# Patient Record
Sex: MALE | Race: WHITE | NOT HISPANIC OR LATINO
[De-identification: names, ages, dates, MRNs, and addresses within clinical notes are randomized per-mention and may not be internally consistent; named-entity substitution may affect disease eponyms.]

---

## 2017-11-02 ENCOUNTER — APPOINTMENT (OUTPATIENT)
Dept: NEUROLOGY | Facility: CLINIC | Age: 72
End: 2017-11-02

## 2018-05-14 ENCOUNTER — APPOINTMENT (OUTPATIENT)
Dept: NEUROLOGY | Facility: CLINIC | Age: 73
End: 2018-05-14
Payer: MEDICARE

## 2018-05-14 VITALS
BODY MASS INDEX: 25.23 KG/M2 | DIASTOLIC BLOOD PRESSURE: 77 MMHG | HEIGHT: 66 IN | WEIGHT: 157 LBS | SYSTOLIC BLOOD PRESSURE: 129 MMHG | HEART RATE: 59 BPM

## 2018-05-14 DIAGNOSIS — Z86.79 PERSONAL HISTORY OF OTHER DISEASES OF THE CIRCULATORY SYSTEM: ICD-10-CM

## 2018-05-14 DIAGNOSIS — R68.89 OTHER GENERAL SYMPTOMS AND SIGNS: ICD-10-CM

## 2018-05-14 DIAGNOSIS — E78.5 HYPERLIPIDEMIA, UNSPECIFIED: ICD-10-CM

## 2018-05-14 DIAGNOSIS — Z86.2 PERSONAL HISTORY OF DISEASES OF THE BLOOD AND BLOOD-FORMING ORGANS AND CERTAIN DISORDERS INVOLVING THE IMMUNE MECHANISM: ICD-10-CM

## 2018-05-14 DIAGNOSIS — Z82.49 FAMILY HISTORY OF ISCHEMIC HEART DISEASE AND OTHER DISEASES OF THE CIRCULATORY SYSTEM: ICD-10-CM

## 2018-05-14 DIAGNOSIS — Z78.9 OTHER SPECIFIED HEALTH STATUS: ICD-10-CM

## 2018-05-14 DIAGNOSIS — Z80.0 FAMILY HISTORY OF MALIGNANT NEOPLASM OF DIGESTIVE ORGANS: ICD-10-CM

## 2018-05-14 PROCEDURE — 99205 OFFICE O/P NEW HI 60 MIN: CPT

## 2018-05-18 ENCOUNTER — OTHER (OUTPATIENT)
Age: 73
End: 2018-05-18

## 2018-05-30 ENCOUNTER — FORM ENCOUNTER (OUTPATIENT)
Age: 73
End: 2018-05-30

## 2018-05-31 ENCOUNTER — APPOINTMENT (OUTPATIENT)
Dept: MRI IMAGING | Facility: CLINIC | Age: 73
End: 2018-05-31
Payer: MEDICARE

## 2018-05-31 ENCOUNTER — OUTPATIENT (OUTPATIENT)
Dept: OUTPATIENT SERVICES | Facility: HOSPITAL | Age: 73
LOS: 1 days | End: 2018-05-31
Payer: COMMERCIAL

## 2018-05-31 DIAGNOSIS — G45.4 TRANSIENT GLOBAL AMNESIA: ICD-10-CM

## 2018-05-31 PROCEDURE — 70551 MRI BRAIN STEM W/O DYE: CPT

## 2018-05-31 PROCEDURE — 70551 MRI BRAIN STEM W/O DYE: CPT | Mod: 26

## 2018-07-13 ENCOUNTER — APPOINTMENT (OUTPATIENT)
Dept: NEUROLOGY | Facility: CLINIC | Age: 73
End: 2018-07-13
Payer: MEDICARE

## 2018-07-13 ENCOUNTER — RESULT CHARGE (OUTPATIENT)
Age: 73
End: 2018-07-13

## 2018-07-13 PROCEDURE — 93886 INTRACRANIAL COMPLETE STUDY: CPT

## 2018-07-13 PROCEDURE — 93892 TCD EMBOLI DETECT W/O INJ: CPT

## 2018-07-13 PROCEDURE — 95819 EEG AWAKE AND ASLEEP: CPT

## 2018-07-13 PROCEDURE — 93880 EXTRACRANIAL BILAT STUDY: CPT

## 2018-08-01 LAB
FOLATE SERPL-MCNC: >20 NG/ML
T PALLIDUM AB SER QL IA: NEGATIVE
T3FREE SERPL-MCNC: 2.5 PG/ML
T4 FREE SERPL-MCNC: 0.8 NG/DL
TSH SERPL-ACNC: 1.87 UIU/ML
VIT B12 SERPL-MCNC: 492 PG/ML

## 2018-08-03 LAB — VIT B1 SERPL-MCNC: 149.7 NMOL/L

## 2018-08-13 ENCOUNTER — APPOINTMENT (OUTPATIENT)
Dept: NEUROLOGY | Facility: CLINIC | Age: 73
End: 2018-08-13
Payer: MEDICARE

## 2018-08-13 VITALS
DIASTOLIC BLOOD PRESSURE: 59 MMHG | WEIGHT: 155 LBS | SYSTOLIC BLOOD PRESSURE: 108 MMHG | HEART RATE: 78 BPM | BODY MASS INDEX: 24.91 KG/M2 | HEIGHT: 66 IN

## 2018-08-13 DIAGNOSIS — R41.3 OTHER AMNESIA: ICD-10-CM

## 2018-08-13 DIAGNOSIS — G45.4 TRANSIENT GLOBAL AMNESIA: ICD-10-CM

## 2018-08-13 PROCEDURE — 99214 OFFICE O/P EST MOD 30 MIN: CPT

## 2022-06-23 ENCOUNTER — OUTPATIENT (OUTPATIENT)
Dept: OUTPATIENT SERVICES | Facility: HOSPITAL | Age: 77
LOS: 1 days | End: 2022-06-23

## 2022-06-23 DIAGNOSIS — R00.2 PALPITATIONS: ICD-10-CM

## 2022-06-23 DIAGNOSIS — R06.00 DYSPNEA, UNSPECIFIED: ICD-10-CM

## 2022-06-24 ENCOUNTER — OUTPATIENT (OUTPATIENT)
Dept: OUTPATIENT SERVICES | Facility: HOSPITAL | Age: 77
LOS: 1 days | End: 2022-06-24
Payer: COMMERCIAL

## 2022-06-24 DIAGNOSIS — R00.2 PALPITATIONS: ICD-10-CM

## 2022-06-24 DIAGNOSIS — R06.00 DYSPNEA, UNSPECIFIED: ICD-10-CM

## 2022-06-24 PROCEDURE — 93017 CV STRESS TEST TRACING ONLY: CPT

## 2022-06-24 PROCEDURE — 93016 CV STRESS TEST SUPVJ ONLY: CPT

## 2022-06-24 PROCEDURE — 78452 HT MUSCLE IMAGE SPECT MULT: CPT | Mod: 26,MH

## 2022-06-24 PROCEDURE — A9502: CPT

## 2022-06-24 PROCEDURE — 78452 HT MUSCLE IMAGE SPECT MULT: CPT | Mod: MH

## 2022-06-24 PROCEDURE — 93018 CV STRESS TEST I&R ONLY: CPT

## 2022-09-26 PROBLEM — I35.9 AORTIC VALVE DISEASE: Status: ACTIVE | Noted: 2022-09-26

## 2022-09-26 PROBLEM — R06.00 DYSPNEA ON EFFORT: Status: ACTIVE | Noted: 2022-09-26

## 2022-09-27 ENCOUNTER — APPOINTMENT (OUTPATIENT)
Dept: CARDIOTHORACIC SURGERY | Facility: CLINIC | Age: 77
End: 2022-09-27

## 2022-09-27 ENCOUNTER — LABORATORY RESULT (OUTPATIENT)
Age: 77
End: 2022-09-27

## 2022-09-27 VITALS
SYSTOLIC BLOOD PRESSURE: 188 MMHG | OXYGEN SATURATION: 98 % | TEMPERATURE: 98 F | HEART RATE: 82 BPM | DIASTOLIC BLOOD PRESSURE: 80 MMHG | RESPIRATION RATE: 14 BRPM

## 2022-09-27 VITALS — HEIGHT: 65 IN | WEIGHT: 157 LBS | BODY MASS INDEX: 26.16 KG/M2

## 2022-09-27 VITALS — DIASTOLIC BLOOD PRESSURE: 94 MMHG | SYSTOLIC BLOOD PRESSURE: 193 MMHG

## 2022-09-27 DIAGNOSIS — R06.00 DYSPNEA, UNSPECIFIED: ICD-10-CM

## 2022-09-27 DIAGNOSIS — M54.30 SCIATICA, UNSPECIFIED SIDE: ICD-10-CM

## 2022-09-27 DIAGNOSIS — I10 ESSENTIAL (PRIMARY) HYPERTENSION: ICD-10-CM

## 2022-09-27 DIAGNOSIS — M48.00 SPINAL STENOSIS, SITE UNSPECIFIED: ICD-10-CM

## 2022-09-27 DIAGNOSIS — Z87.11 PERSONAL HISTORY OF PEPTIC ULCER DISEASE: ICD-10-CM

## 2022-09-27 DIAGNOSIS — I35.9 NONRHEUMATIC AORTIC VALVE DISORDER, UNSPECIFIED: ICD-10-CM

## 2022-09-27 LAB
ALBUMIN SERPL ELPH-MCNC: 4.9 G/DL
ALP BLD-CCNC: 94 U/L
ALT SERPL-CCNC: 14 U/L
ANION GAP SERPL CALC-SCNC: 15 MMOL/L
AST SERPL-CCNC: 21 U/L
BILIRUB SERPL-MCNC: 1.2 MG/DL
BUN SERPL-MCNC: 28 MG/DL
CALCIUM SERPL-MCNC: 10.9 MG/DL
CHLORIDE SERPL-SCNC: 105 MMOL/L
CO2 SERPL-SCNC: 23 MMOL/L
CREAT SERPL-MCNC: 1.02 MG/DL
EGFR: 75 ML/MIN/1.73M2
GLUCOSE SERPL-MCNC: 88 MG/DL
NT-PROBNP SERPL-MCNC: 268 PG/ML
POTASSIUM SERPL-SCNC: 4.7 MMOL/L
PROT SERPL-MCNC: 8.1 G/DL
SODIUM SERPL-SCNC: 144 MMOL/L

## 2022-09-27 PROCEDURE — 99215 OFFICE O/P EST HI 40 MIN: CPT

## 2022-09-27 RX ORDER — METOPROLOL SUCCINATE 25 MG/1
25 TABLET, EXTENDED RELEASE ORAL
Qty: 30 | Refills: 1 | Status: COMPLETED | COMMUNITY
Start: 2022-09-27 | End: 2022-09-27

## 2022-09-27 RX ORDER — ASPIRIN 81 MG/1
81 TABLET, CHEWABLE ORAL
Qty: 30 | Refills: 3 | Status: COMPLETED | COMMUNITY
Start: 2018-05-14 | End: 2022-09-27

## 2022-09-27 RX ORDER — FOLIC ACID 1 MG/1
1 TABLET ORAL
Qty: 30 | Refills: 0 | Status: ACTIVE | COMMUNITY
Start: 2022-09-27

## 2022-09-27 NOTE — REVIEW OF SYSTEMS
[Feeling Tired] : feeling tired [Loss Of Hearing] : hearing loss [Shortness Of Breath] : shortness of breath [Joint Stiffness] : joint stiffness [Eye Pain] : no eye pain [Earache] : no earache [Chest Pain] : no chest pain [Palpitations] : no palpitations [Abdominal Pain] : no abdominal pain [Vomiting] : no vomiting [Dysuria] : no dysuria [Skin Lesions] : no skin lesions [Dizziness] : no dizziness

## 2022-09-27 NOTE — END OF VISIT
[Time Spent: ___ minutes] : I have spent [unfilled] minutes of time on the encounter. [FreeTextEntry3] : I personally performed the services described in the documentation, reviewed the documentation recorded by the scribe in my presence and it accurately and completely records my words and actions.\par \par I, Mima Shaffer NP, am scribing for Dr. Godfrey Gonzalez, the following sections HISTORY OF PRESENT ILLNESS, PAST MEDICAL/FAMILY/SOCIAL HISTORY; REVIEW OF SYSTEMS; VITAL SIGNS; PHYSICAL EXAM; DISPOSITION.\par

## 2022-09-27 NOTE — DATA REVIEWED
[FreeTextEntry1] : TTE on 8/30/2022 demonstrated\par LV cavity size is normal. \par LV function is 68%\par aortic valve is not well visualized\par Aortic valve appears heavily calcified, AoV 4.2 m/sec, pGr 54 mmHg, PHILL 0.95 cm²\par Consistent with severe AS\par \par Stress testing 6/2022\par Review of raw data shows: increased gut activity around\par lateral ventricular heart border on both rest and stress. This decreases sensitivity.  The left ventricle was normal in size. Normal myocardial perfusion scan, with no evidence of infarction or\par inducible ischemia, though with decreased sensitivity due to adjacent loop of bowel as per above.\par Transient ischemic dilation (TID) ratio was normal at: 0.77\par GATED ANALYSIS:Post-stress resting myocardial perfusion gated SPECT imaging was performed (LVEF = 69 %;LVEDV = 79 ml.) IMPRESSIONS: The left ventricle was normal in size. Normal myocardial\par perfusion scan, with no evidence of infarction or inducible ischemia, though with decreased sensitivity due to adjacent loop of bowel as per above.  * Post-stress resting myocardial perfusion gated SPECT imaging was performed (LVEF = 69 %;LVEDV = 79 ml.)

## 2022-09-27 NOTE — HISTORY OF PRESENT ILLNESS
[FreeTextEntry1] : Darnell is a 77 year old male retired  who is referred by Dr. Wu for evaluation for NAJMA. \par \par He reports dyspnea on exertion when climbing 1 flight of stairs (NYHA II). The dyspnea resolves at rest. Patient reports two prior episodes of "disorientation." Follow up MRI was unremarkable and he was told he might have had a TIAs. \par \par His son Ronald endorses that patient has episodes of memory loss within the last five year. Patient lives with his wife (caretaker) and his independent in his ADLs. \par \par Abdominal Aortic Aneurysm-\par US from 2018 showed fusiform aneurysm dilatation measuring 6.2 cm x 3.8 x 3/5 cm \par \par TTE on 8/30/2022 demonstrated\par LV cavity size is normal. \par LV function is 68%\par aortic valve is not well visualized\par Aortic valve appears heavily calcified, AoV 4.2 m/sec, pGr 54 mmHg, PHILL 0.95 cm²\par Consistent with severe AS\par \par

## 2022-09-27 NOTE — PHYSICAL EXAM
[General Appearance - Alert] : alert [Sclera] : the sclera and conjunctiva were normal [Outer Ear] : the ears and nose were normal in appearance [Jugular Venous Distention Increased] : there was no jugular-venous distention [] : no respiratory distress [Respiration, Rhythm And Depth] : normal respiratory rhythm and effort [II] : a grade 2 [Examination Of The Chest] : the chest was normal in appearance [Breast Appearance] : normal in appearance [Bowel Sounds] : normal bowel sounds [Cervical Lymph Nodes Enlarged Posterior Bilaterally] : posterior cervical [Cervical Lymph Nodes Enlarged Anterior Bilaterally] : anterior cervical [No CVA Tenderness] : no ~M costovertebral angle tenderness [Involuntary Movements] : no involuntary movements were seen [Skin Color & Pigmentation] : normal skin color and pigmentation [Oriented To Time, Place, And Person] : oriented to person, place, and time [Right Carotid Bruit] : no bruit heard over the right carotid [Left Carotid Bruit] : no bruit heard over the left carotid [FreeTextEntry1] : deferred

## 2022-09-27 NOTE — ASSESSMENT
[FreeTextEntry1] : Darnell is a 77 year old male with PMH of gastric ulcer, Rheumatic fever (age 10), HLD, HTN, anemia, and aortic valve stenosis. He was referred by Dr. Wu for evaluation for NAJMA. He reports dyspnea on exertion when climbing 1 flight of stairs (NYHA II). The dyspnea resolves at rest. Patient reports two prior episodes of "disorientation." Follow up MRI as per patient he was told were possible TIAs. \par \par His son Ronald endorses that patient has episodes of memory loss within the last five year. He lives with his wife. \par \par Abdominal Aortic Aneurysm-\par US from 2018 showed fusiform aneurysm dilatation measuring 6.2 cm x 3.8 x 3/5 cm \par \par TTE on 8/30/2022 demonstrated\par LV cavity size is normal. \par LV function is 68%\par aortic valve is not well visualized\par Aortic valve appears heavily calcified, AoV 4.2 m/sec, pGr 54 mmHg, PHILL 0.95 cm²\par Consistent with severe AS\par \par Stress testing 6/2022\par Review of raw data shows: increased gut activity around\par lateral ventricular heart border on both rest and stress. This decreases sensitivity.  The left ventricle was normal in size. Normal myocardial perfusion scan, with no evidence of infarction or\par inducible ischemia, though with decreased sensitivity due to adjacent loop of bowel as per above.\par Transient ischemic dilation (TID) ratio was normal at: 0.77\par GATED ANALYSIS:Post-stress resting myocardial perfusion gated SPECT imaging was performed (LVEF = 69 %;LVEDV = 79 ml.) IMPRESSIONS: The left ventricle was normal in size. Normal myocardial\par perfusion scan, with no evidence of infarction or inducible ischemia, though with decreased sensitivity due to adjacent loop of bowel as per above.  * Post-stress resting myocardial perfusion gated SPECT imaging was performed (LVEF = 69 %;LVEDV = 79 ml.) \par \par I have reviewed the patient's medical records, diagnostic images during the time of this office consultation and have made the following recommendation. Review of the imaging shows severe AS and will require surgical intervention. \par \par Risks, benefits and alternatives to surgery discussed. Risks included but not limited to bleeding, risk of stroke, myocardial Infarction, kidney problems, blood transfusion, permanent pacemaker implantation, infections and death. Operative mortality and complication risks are low. Various approaches discussed in detail. The patient will benefit and is a candidate for a TAVR. \par \par All questions have been fully answered and concerns addressed. Patient would like to proceed with surgery.\par \par Plan:\par 1) Hypertension- Start Losartan 25mg daily and follow up in 1 week with PCP for blood pressure check. \par      --Patient seems reluctant to start Losartan due to his father's history with an adverse reaction to "blood pressure medication."\par 2) Structural CT\par 3) Cardiac angiogram \par

## 2022-09-28 LAB
BASOPHILS # BLD AUTO: 0.06 K/UL
BASOPHILS NFR BLD AUTO: 0.5 %
EOSINOPHIL # BLD AUTO: 0.37 K/UL
EOSINOPHIL NFR BLD AUTO: 2.9 %
HCT VFR BLD CALC: 45.7 %
HGB BLD-MCNC: 14.3 G/DL
IMM GRANULOCYTES NFR BLD AUTO: 0.4 %
LYMPHOCYTES # BLD AUTO: 3.7 K/UL
LYMPHOCYTES NFR BLD AUTO: 29 %
MAN DIFF?: NORMAL
MCHC RBC-ENTMCNC: 31.3 GM/DL
MCHC RBC-ENTMCNC: 32.9 PG
MCV RBC AUTO: 105.3 FL
MONOCYTES # BLD AUTO: 1.41 K/UL
MONOCYTES NFR BLD AUTO: 11.1 %
NEUTROPHILS # BLD AUTO: 7.17 K/UL
NEUTROPHILS NFR BLD AUTO: 56.1 %
PLATELET # BLD AUTO: 139 K/UL
RBC # BLD: 4.34 M/UL
RBC # FLD: 12.8 %
WBC # FLD AUTO: 12.76 K/UL

## 2022-10-05 ENCOUNTER — APPOINTMENT (OUTPATIENT)
Dept: CARDIOLOGY | Facility: CLINIC | Age: 77
End: 2022-10-05

## 2022-10-17 ENCOUNTER — APPOINTMENT (OUTPATIENT)
Dept: ULTRASOUND IMAGING | Facility: HOSPITAL | Age: 77
End: 2022-10-17

## 2023-01-19 RX ORDER — CHLORHEXIDINE GLUCONATE 4 %
LIQUID (ML) TOPICAL DAILY
Refills: 0 | Status: DISCONTINUED | COMMUNITY
Start: 2018-05-14 | End: 2023-01-19

## 2023-01-19 RX ORDER — ATORVASTATIN CALCIUM 40 MG/1
40 TABLET, FILM COATED ORAL DAILY
Refills: 0 | Status: DISCONTINUED | COMMUNITY
Start: 2018-05-14 | End: 2023-01-19

## 2023-01-19 RX ORDER — CALCIUM CITRATE/VITAMIN D3 315MG-6.25
TABLET ORAL
Refills: 0 | Status: ACTIVE | COMMUNITY

## 2023-01-19 RX ORDER — AMLODIPINE BESYLATE 2.5 MG/1
2.5 TABLET ORAL DAILY
Refills: 0 | Status: ACTIVE | COMMUNITY

## 2023-01-19 RX ORDER — PRAVASTATIN SODIUM 40 MG/1
40 TABLET ORAL DAILY
Refills: 0 | Status: ACTIVE | COMMUNITY

## 2023-01-19 RX ORDER — LOSARTAN POTASSIUM 25 MG/1
25 TABLET, FILM COATED ORAL
Qty: 30 | Refills: 0 | Status: DISCONTINUED | COMMUNITY
Start: 2022-09-27 | End: 2023-01-19

## 2023-01-24 ENCOUNTER — NON-APPOINTMENT (OUTPATIENT)
Age: 78
End: 2023-01-24

## 2023-01-24 ENCOUNTER — APPOINTMENT (OUTPATIENT)
Dept: CT IMAGING | Facility: HOSPITAL | Age: 78
End: 2023-01-24

## 2023-01-24 ENCOUNTER — APPOINTMENT (OUTPATIENT)
Dept: CARDIOLOGY | Facility: CLINIC | Age: 78
End: 2023-01-24
Payer: MEDICARE

## 2023-01-24 ENCOUNTER — OUTPATIENT (OUTPATIENT)
Dept: OUTPATIENT SERVICES | Facility: HOSPITAL | Age: 78
LOS: 1 days | End: 2023-01-24
Payer: MEDICARE

## 2023-01-24 ENCOUNTER — APPOINTMENT (OUTPATIENT)
Dept: CARDIOTHORACIC SURGERY | Facility: CLINIC | Age: 78
End: 2023-01-24

## 2023-01-24 VITALS
WEIGHT: 155 LBS | TEMPERATURE: 98 F | DIASTOLIC BLOOD PRESSURE: 82 MMHG | OXYGEN SATURATION: 97 % | HEART RATE: 98 BPM | SYSTOLIC BLOOD PRESSURE: 150 MMHG | HEIGHT: 65 IN | BODY MASS INDEX: 25.83 KG/M2

## 2023-01-24 DIAGNOSIS — I35.9 NONRHEUMATIC AORTIC VALVE DISORDER, UNSPECIFIED: ICD-10-CM

## 2023-01-24 PROCEDURE — 74174 CTA ABD&PLVS W/CONTRAST: CPT | Mod: 26

## 2023-01-24 PROCEDURE — 75574 CT ANGIO HRT W/3D IMAGE: CPT | Mod: 26

## 2023-01-24 PROCEDURE — 99204 OFFICE O/P NEW MOD 45 MIN: CPT | Mod: 25

## 2023-01-24 PROCEDURE — 93000 ELECTROCARDIOGRAM COMPLETE: CPT

## 2023-01-24 PROCEDURE — 71275 CT ANGIOGRAPHY CHEST: CPT | Mod: 26

## 2023-01-26 ENCOUNTER — NON-APPOINTMENT (OUTPATIENT)
Age: 78
End: 2023-01-26

## 2023-01-27 NOTE — HISTORY OF PRESENT ILLNESS
[FreeTextEntry1] : 77-year-old male with known history of severe aortic stenosis.  He was evaluated in the TAVR clinic 3 months ago, at that point patient was reevaluating his decision about proceeding with valve intervention.  He is here on a visit with us today to discuss TAVR options, he underwent a CT scan done today results are not yet available.  Patient reports that he has had difficulty walking 1 flight of stairs which is limited by his back pain because of spinal stenosis and his fatigue.  He also states that he wants to get spinal surgery done but this has been deferred in view of his aortic stenosis and his symptoms arising from aortic stenosis that make him high risk for surgery.  Denies any orthopnea PND edema syncope.\par \par \par TTE on 8/30/2022 demonstrated\par LV cavity size is normal. \par LV function is 68%\par aortic valve is not well visualized\par Aortic valve appears heavily calcified, AoV 4.2 m/sec, pGr 54 mmHg, PHILL 0.95 cm²\par Consistent with severe AS\par

## 2023-01-27 NOTE — ASSESSMENT
[FreeTextEntry1] : Severe symptomatic aortic stenosis:\par Detailed discussion with the patient and family about Aortic stenosis , including the clinic presentation, symptoms and natural History. Also explained the interventional indications and options for SAVR vs TAVR. TAVR procedure was explained to them, including the potential risk not just limited to vascular injury, needs for vascular intervention, transfusion, MI, CVA, need for PPM, death. \par Patient and family verbalized the understanding of above information and would like to pursue further w.u with Cath.  And further planning for TAVR as per the findings from the cardiac cath procedure and the CT scan that was performed today.\par

## 2023-01-27 NOTE — PHYSICAL EXAM
[Frail] : frail [Normal Conjunctiva] : normal conjunctiva [Normal Venous Pressure] : normal venous pressure [Normal S1, S2] : normal S1, S2 [Clear Lung Fields] : clear lung fields [Soft] : abdomen soft [de-identified] : 3 out of 6 late peaking ejection systolic murmur at the aortic area

## 2023-01-27 NOTE — REVIEW OF SYSTEMS
[Feeling Tired] : feeling tired [Eye Pain] : no eye pain [Earache] : no earache [Loss Of Hearing] : hearing loss [Chest Pain] : no chest pain [Palpitations] : no palpitations [Shortness Of Breath] : shortness of breath [Abdominal Pain] : no abdominal pain [Vomiting] : no vomiting [Dysuria] : no dysuria [Joint Stiffness] : joint stiffness [Skin Lesions] : no skin lesions [Dizziness] : no dizziness

## 2023-01-30 ENCOUNTER — OUTPATIENT (OUTPATIENT)
Dept: OUTPATIENT SERVICES | Facility: HOSPITAL | Age: 78
LOS: 1 days | End: 2023-01-30
Payer: COMMERCIAL

## 2023-01-30 DIAGNOSIS — Z11.52 ENCOUNTER FOR SCREENING FOR COVID-19: ICD-10-CM

## 2023-01-30 LAB — SARS-COV-2 RNA SPEC QL NAA+PROBE: SIGNIFICANT CHANGE UP

## 2023-01-30 PROCEDURE — C9803: CPT

## 2023-01-30 PROCEDURE — U0003: CPT

## 2023-01-30 PROCEDURE — U0005: CPT

## 2023-02-01 ENCOUNTER — APPOINTMENT (OUTPATIENT)
Dept: ULTRASOUND IMAGING | Facility: HOSPITAL | Age: 78
End: 2023-02-01

## 2023-02-01 ENCOUNTER — OUTPATIENT (OUTPATIENT)
Dept: OUTPATIENT SERVICES | Facility: HOSPITAL | Age: 78
LOS: 1 days | End: 2023-02-01
Payer: COMMERCIAL

## 2023-02-01 VITALS
OXYGEN SATURATION: 98 % | DIASTOLIC BLOOD PRESSURE: 69 MMHG | RESPIRATION RATE: 18 BRPM | HEIGHT: 65 IN | HEART RATE: 64 BPM | TEMPERATURE: 97 F | WEIGHT: 160.06 LBS | SYSTOLIC BLOOD PRESSURE: 145 MMHG

## 2023-02-01 DIAGNOSIS — I35.0 NONRHEUMATIC AORTIC (VALVE) STENOSIS: ICD-10-CM

## 2023-02-01 DIAGNOSIS — Z98.890 OTHER SPECIFIED POSTPROCEDURAL STATES: Chronic | ICD-10-CM

## 2023-02-01 DIAGNOSIS — Z01.818 ENCOUNTER FOR OTHER PREPROCEDURAL EXAMINATION: ICD-10-CM

## 2023-02-01 DIAGNOSIS — Z90.81 ACQUIRED ABSENCE OF SPLEEN: Chronic | ICD-10-CM

## 2023-02-01 LAB
A1C WITH ESTIMATED AVERAGE GLUCOSE RESULT: 5.1 % — SIGNIFICANT CHANGE UP (ref 4–5.6)
ANION GAP SERPL CALC-SCNC: 9 MMOL/L — SIGNIFICANT CHANGE UP (ref 5–17)
BLD GP AB SCN SERPL QL: NEGATIVE — SIGNIFICANT CHANGE UP
BUN SERPL-MCNC: 21 MG/DL — SIGNIFICANT CHANGE UP (ref 7–23)
CALCIUM SERPL-MCNC: 10.3 MG/DL — SIGNIFICANT CHANGE UP (ref 8.4–10.5)
CHLORIDE SERPL-SCNC: 108 MMOL/L — SIGNIFICANT CHANGE UP (ref 96–108)
CO2 SERPL-SCNC: 27 MMOL/L — SIGNIFICANT CHANGE UP (ref 22–31)
CREAT SERPL-MCNC: 0.95 MG/DL — SIGNIFICANT CHANGE UP (ref 0.5–1.3)
EGFR: 82 ML/MIN/1.73M2 — SIGNIFICANT CHANGE UP
ESTIMATED AVERAGE GLUCOSE: 100 MG/DL — SIGNIFICANT CHANGE UP (ref 68–114)
GLUCOSE SERPL-MCNC: 106 MG/DL — HIGH (ref 70–99)
HCT VFR BLD CALC: 43.4 % — SIGNIFICANT CHANGE UP (ref 39–50)
HGB BLD-MCNC: 14.2 G/DL — SIGNIFICANT CHANGE UP (ref 13–17)
MCHC RBC-ENTMCNC: 32.7 GM/DL — SIGNIFICANT CHANGE UP (ref 32–36)
MCHC RBC-ENTMCNC: 33.2 PG — SIGNIFICANT CHANGE UP (ref 27–34)
MCV RBC AUTO: 101.4 FL — HIGH (ref 80–100)
NRBC # BLD: 0 /100 WBCS — SIGNIFICANT CHANGE UP (ref 0–0)
PLATELET # BLD AUTO: 148 K/UL — LOW (ref 150–400)
POTASSIUM SERPL-MCNC: 4.6 MMOL/L — SIGNIFICANT CHANGE UP (ref 3.5–5.3)
POTASSIUM SERPL-SCNC: 4.6 MMOL/L — SIGNIFICANT CHANGE UP (ref 3.5–5.3)
RBC # BLD: 4.28 M/UL — SIGNIFICANT CHANGE UP (ref 4.2–5.8)
RBC # FLD: 12.7 % — SIGNIFICANT CHANGE UP (ref 10.3–14.5)
RH IG SCN BLD-IMP: POSITIVE — SIGNIFICANT CHANGE UP
SODIUM SERPL-SCNC: 144 MMOL/L — SIGNIFICANT CHANGE UP (ref 135–145)
WBC # BLD: 10.39 K/UL — SIGNIFICANT CHANGE UP (ref 3.8–10.5)
WBC # FLD AUTO: 10.39 K/UL — SIGNIFICANT CHANGE UP (ref 3.8–10.5)

## 2023-02-01 PROCEDURE — 80048 BASIC METABOLIC PNL TOTAL CA: CPT

## 2023-02-01 PROCEDURE — G0463: CPT

## 2023-02-01 PROCEDURE — 86850 RBC ANTIBODY SCREEN: CPT

## 2023-02-01 PROCEDURE — 87641 MR-STAPH DNA AMP PROBE: CPT

## 2023-02-01 PROCEDURE — 86901 BLOOD TYPING SEROLOGIC RH(D): CPT

## 2023-02-01 PROCEDURE — 83036 HEMOGLOBIN GLYCOSYLATED A1C: CPT

## 2023-02-01 PROCEDURE — 93880 EXTRACRANIAL BILAT STUDY: CPT | Mod: 26

## 2023-02-01 PROCEDURE — 93880 EXTRACRANIAL BILAT STUDY: CPT

## 2023-02-01 PROCEDURE — 87640 STAPH A DNA AMP PROBE: CPT

## 2023-02-01 PROCEDURE — 85027 COMPLETE CBC AUTOMATED: CPT

## 2023-02-01 PROCEDURE — 86900 BLOOD TYPING SEROLOGIC ABO: CPT

## 2023-02-01 RX ORDER — CEFUROXIME AXETIL 250 MG
1500 TABLET ORAL ONCE
Refills: 0 | Status: DISCONTINUED | OUTPATIENT
Start: 2023-02-09 | End: 2023-02-10

## 2023-02-01 NOTE — H&P PST ADULT - NSICDXPASTMEDICALHX_GEN_ALL_CORE_FT
PAST MEDICAL HISTORY:  Aortic stenosis     Congenital spherocytic anemia     HLD (hyperlipidemia)     Hypertension      PAST MEDICAL HISTORY:  Aortic stenosis     Bladder calculus     BPH without urinary obstruction     Congenital spherocytic anemia     HLD (hyperlipidemia)     Hypertension

## 2023-02-01 NOTE — H&P PST ADULT - HISTORY OF PRESENT ILLNESS
This is a 78 y/o male PMH severe aortic stenosis, spherocytic anemia at age 5, S/P splenectomy,  This is a 76 y/o male PMH lumbar stenosis, bladder stone, BPH,  hypertension, HLD, spherocytic anemia at age 5, S/P splenectomy, severe aortic stenosis, denies worsening dyspnea,  angina or syncope.  Presents today for TAVR 2/09/23, angiogram scheduled for tomorrow.    No H/O COVID infection, COVID swab scheduled 2/06/23 This is a 76 y/o male PMH lumbar stenosis, bladder stone, BPH, hypertension, HLD, spherocytic anemia at age 5, S/P splenectomy, severe aortic stenosis, denies worsening dyspnea, angina or syncope.  Presents today for TAVR 2/09/23, angiogram scheduled for tomorrow.    No H/O COVID infection, COVID swab scheduled 2/06/23

## 2023-02-02 ENCOUNTER — OUTPATIENT (OUTPATIENT)
Dept: OUTPATIENT SERVICES | Facility: HOSPITAL | Age: 78
LOS: 1 days | End: 2023-02-02
Payer: COMMERCIAL

## 2023-02-02 ENCOUNTER — TRANSCRIPTION ENCOUNTER (OUTPATIENT)
Age: 78
End: 2023-02-02

## 2023-02-02 VITALS
TEMPERATURE: 99 F | HEIGHT: 65 IN | OXYGEN SATURATION: 97 % | DIASTOLIC BLOOD PRESSURE: 93 MMHG | SYSTOLIC BLOOD PRESSURE: 188 MMHG | RESPIRATION RATE: 16 BRPM | HEART RATE: 91 BPM | WEIGHT: 162.04 LBS

## 2023-02-02 VITALS
DIASTOLIC BLOOD PRESSURE: 69 MMHG | OXYGEN SATURATION: 96 % | HEART RATE: 82 BPM | SYSTOLIC BLOOD PRESSURE: 135 MMHG | RESPIRATION RATE: 16 BRPM

## 2023-02-02 DIAGNOSIS — I35.9 NONRHEUMATIC AORTIC VALVE DISORDER, UNSPECIFIED: ICD-10-CM

## 2023-02-02 DIAGNOSIS — Z90.81 ACQUIRED ABSENCE OF SPLEEN: Chronic | ICD-10-CM

## 2023-02-02 DIAGNOSIS — Z98.890 OTHER SPECIFIED POSTPROCEDURAL STATES: Chronic | ICD-10-CM

## 2023-02-02 LAB
ANION GAP SERPL CALC-SCNC: 9 MMOL/L — SIGNIFICANT CHANGE UP (ref 5–17)
BUN SERPL-MCNC: 22 MG/DL — SIGNIFICANT CHANGE UP (ref 7–23)
CALCIUM SERPL-MCNC: 10.3 MG/DL — SIGNIFICANT CHANGE UP (ref 8.4–10.5)
CHLORIDE SERPL-SCNC: 104 MMOL/L — SIGNIFICANT CHANGE UP (ref 96–108)
CO2 SERPL-SCNC: 29 MMOL/L — SIGNIFICANT CHANGE UP (ref 22–31)
CREAT SERPL-MCNC: 1.03 MG/DL — SIGNIFICANT CHANGE UP (ref 0.5–1.3)
EGFR: 74 ML/MIN/1.73M2 — SIGNIFICANT CHANGE UP
GLUCOSE SERPL-MCNC: 100 MG/DL — HIGH (ref 70–99)
HCT VFR BLD CALC: 46.6 % — SIGNIFICANT CHANGE UP (ref 39–50)
HGB BLD-MCNC: 15.5 G/DL — SIGNIFICANT CHANGE UP (ref 13–17)
MCHC RBC-ENTMCNC: 33.2 PG — SIGNIFICANT CHANGE UP (ref 27–34)
MCHC RBC-ENTMCNC: 33.3 GM/DL — SIGNIFICANT CHANGE UP (ref 32–36)
MCV RBC AUTO: 99.8 FL — SIGNIFICANT CHANGE UP (ref 80–100)
MRSA PCR RESULT.: SIGNIFICANT CHANGE UP
NRBC # BLD: 0 /100 WBCS — SIGNIFICANT CHANGE UP (ref 0–0)
PLATELET # BLD AUTO: 163 K/UL — SIGNIFICANT CHANGE UP (ref 150–400)
POTASSIUM SERPL-MCNC: 4.7 MMOL/L — SIGNIFICANT CHANGE UP (ref 3.5–5.3)
POTASSIUM SERPL-SCNC: 4.7 MMOL/L — SIGNIFICANT CHANGE UP (ref 3.5–5.3)
RBC # BLD: 4.67 M/UL — SIGNIFICANT CHANGE UP (ref 4.2–5.8)
RBC # FLD: 12.9 % — SIGNIFICANT CHANGE UP (ref 10.3–14.5)
S AUREUS DNA NOSE QL NAA+PROBE: DETECTED
SODIUM SERPL-SCNC: 142 MMOL/L — SIGNIFICANT CHANGE UP (ref 135–145)
WBC # BLD: 12.57 K/UL — HIGH (ref 3.8–10.5)
WBC # FLD AUTO: 12.57 K/UL — HIGH (ref 3.8–10.5)

## 2023-02-02 PROCEDURE — 85027 COMPLETE CBC AUTOMATED: CPT

## 2023-02-02 PROCEDURE — 93454 CORONARY ARTERY ANGIO S&I: CPT | Mod: 26

## 2023-02-02 PROCEDURE — 93454 CORONARY ARTERY ANGIO S&I: CPT

## 2023-02-02 PROCEDURE — 93005 ELECTROCARDIOGRAM TRACING: CPT

## 2023-02-02 PROCEDURE — 80048 BASIC METABOLIC PNL TOTAL CA: CPT

## 2023-02-02 PROCEDURE — C1894: CPT

## 2023-02-02 PROCEDURE — C1887: CPT

## 2023-02-02 PROCEDURE — C1769: CPT

## 2023-02-02 PROCEDURE — 93010 ELECTROCARDIOGRAM REPORT: CPT

## 2023-02-02 NOTE — H&P CARDIOLOGY - HISTORY OF PRESENT ILLNESS
77 year old male h/o aortic valve stenosis (PHILL 0.95 cm2), AAA (6.2cm x 3.8 cm x 3.5 cm), HLD, hemolytic anemia, rheumatic fever and forgetfulness who c/o HERRERA with one flight (NYHAII) that resolves with rest who presents  for left heart cath in the setting of NAJMA work up.  77 year old male h/o aortic valve stenosis (PHILL 0.95 cm2), AAA (6.2cm x 3.8 cm x 3.5 cm), HTN,  HLD, hemolytic anemia, rheumatic fever and forgetfulness who c/o HERRERA with one flight (NYHAII) that resolves with rest who presents  for left heart cath in the setting of NAJMA work up.

## 2023-02-02 NOTE — ASU PATIENT PROFILE, ADULT - FALL HARM RISK - UNIVERSAL INTERVENTIONS
Bed in lowest position, wheels locked, appropriate side rails in place/Call bell, personal items and telephone in reach/Instruct patient to call for assistance before getting out of bed or chair/Non-slip footwear when patient is out of bed/[place] to call system/Physically safe environment - no spills, clutter or unnecessary equipment/Purposeful Proactive Rounding/Room/bathroom lighting operational, light cord in reach

## 2023-02-02 NOTE — ASU DISCHARGE PLAN (ADULT/PEDIATRIC) - CARE PROVIDER_API CALL
Naheed Aquino)  Cardiology; Internal Medicine  [phone] [place] [address], [address]  [place], [place] [id number]  Phone: [phone]  Fax: [phone]  Follow Up Time:

## 2023-02-02 NOTE — ASU DISCHARGE PLAN (ADULT/PEDIATRIC) - NS MD DC FALL RISK RISK
For information on Fall & Injury Prevention, visit: [email or web address] OR  [email or web address] OR  https://www.cdc.gov/steadi/patient.html

## 2023-02-02 NOTE — H&P CARDIOLOGY - NSICDXPASTMEDICALHX_GEN_ALL_CORE_FT
PAST MEDICAL HISTORY:  Aortic stenosis     Bladder calculus     BPH without urinary obstruction     Congenital spherocytic anemia     Gastric ulcer     H/O hemolytic anemia     H/O: rheumatic fever     History of abdominal aortic aneurysm (AAA)     HLD (hyperlipidemia)      PAST MEDICAL HISTORY:  Aortic stenosis     Bladder calculus     BPH without urinary obstruction     Congenital spherocytic anemia     Gastric ulcer     H/O hemolytic anemia     H/O: rheumatic fever     History of abdominal aortic aneurysm (AAA)     HLD (hyperlipidemia)     HTN (hypertension)

## 2023-02-02 NOTE — H&P CARDIOLOGY - NSICDXFAMILYHX_GEN_ALL_CORE_FT
FAMILY HISTORY:  Family history of colon cancer in mother    Father  Still living? No  FH: myocardial infarction, Age at diagnosis: Age Unknown

## 2023-02-03 DIAGNOSIS — A49.01 METHICILLIN SUSCEPTIBLE STAPHYLOCOCCUS AUREUS INFECTION, UNSPECIFIED SITE: ICD-10-CM

## 2023-02-06 ENCOUNTER — OUTPATIENT (OUTPATIENT)
Dept: OUTPATIENT SERVICES | Facility: HOSPITAL | Age: 78
LOS: 1 days | End: 2023-02-06
Payer: COMMERCIAL

## 2023-02-06 DIAGNOSIS — Z11.52 ENCOUNTER FOR SCREENING FOR COVID-19: ICD-10-CM

## 2023-02-06 DIAGNOSIS — Z98.890 OTHER SPECIFIED POSTPROCEDURAL STATES: Chronic | ICD-10-CM

## 2023-02-06 DIAGNOSIS — Z90.81 ACQUIRED ABSENCE OF SPLEEN: Chronic | ICD-10-CM

## 2023-02-06 PROBLEM — Z86.79 PERSONAL HISTORY OF OTHER DISEASES OF THE CIRCULATORY SYSTEM: Chronic | Status: ACTIVE | Noted: 2023-02-02

## 2023-02-06 PROBLEM — N21.0 CALCULUS IN BLADDER: Chronic | Status: ACTIVE | Noted: 2023-02-01

## 2023-02-06 PROBLEM — I35.0 NONRHEUMATIC AORTIC (VALVE) STENOSIS: Chronic | Status: ACTIVE | Noted: 2023-02-01

## 2023-02-06 PROBLEM — I10 ESSENTIAL (PRIMARY) HYPERTENSION: Chronic | Status: ACTIVE | Noted: 2023-02-02

## 2023-02-06 PROBLEM — K25.9 GASTRIC ULCER, UNSPECIFIED AS ACUTE OR CHRONIC, WITHOUT HEMORRHAGE OR PERFORATION: Chronic | Status: ACTIVE | Noted: 2023-02-02

## 2023-02-06 PROBLEM — D58.0 HEREDITARY SPHEROCYTOSIS: Chronic | Status: ACTIVE | Noted: 2023-02-01

## 2023-02-06 PROBLEM — Z86.2 PERSONAL HISTORY OF DISEASES OF THE BLOOD AND BLOOD-FORMING ORGANS AND CERTAIN DISORDERS INVOLVING THE IMMUNE MECHANISM: Chronic | Status: ACTIVE | Noted: 2023-02-02

## 2023-02-06 PROBLEM — N40.0 BENIGN PROSTATIC HYPERPLASIA WITHOUT LOWER URINARY TRACT SYMPTOMS: Chronic | Status: ACTIVE | Noted: 2023-02-01

## 2023-02-06 PROBLEM — E78.5 HYPERLIPIDEMIA, UNSPECIFIED: Chronic | Status: ACTIVE | Noted: 2023-02-01

## 2023-02-06 LAB — SARS-COV-2 RNA SPEC QL NAA+PROBE: SIGNIFICANT CHANGE UP

## 2023-02-06 PROCEDURE — U0003: CPT

## 2023-02-06 PROCEDURE — U0005: CPT

## 2023-02-06 PROCEDURE — C9803: CPT

## 2023-02-09 ENCOUNTER — INPATIENT (INPATIENT)
Facility: HOSPITAL | Age: 78
LOS: 0 days | Discharge: HOME HEALTH | DRG: 267 | End: 2023-02-10
Attending: THORACIC SURGERY (CARDIOTHORACIC VASCULAR SURGERY) | Admitting: THORACIC SURGERY (CARDIOTHORACIC VASCULAR SURGERY)
Payer: COMMERCIAL

## 2023-02-09 ENCOUNTER — APPOINTMENT (OUTPATIENT)
Dept: CARDIOTHORACIC SURGERY | Facility: HOSPITAL | Age: 78
End: 2023-02-09

## 2023-02-09 ENCOUNTER — TRANSCRIPTION ENCOUNTER (OUTPATIENT)
Age: 78
End: 2023-02-09

## 2023-02-09 VITALS
HEART RATE: 63 BPM | HEIGHT: 65 IN | DIASTOLIC BLOOD PRESSURE: 74 MMHG | RESPIRATION RATE: 18 BRPM | WEIGHT: 159.61 LBS | TEMPERATURE: 98 F | OXYGEN SATURATION: 99 % | SYSTOLIC BLOOD PRESSURE: 157 MMHG

## 2023-02-09 VITALS
OXYGEN SATURATION: 99 % | DIASTOLIC BLOOD PRESSURE: 74 MMHG | HEART RATE: 63 BPM | RESPIRATION RATE: 18 BRPM | WEIGHT: 162.04 LBS | HEIGHT: 65 IN | TEMPERATURE: 98 F | SYSTOLIC BLOOD PRESSURE: 157 MMHG

## 2023-02-09 DIAGNOSIS — I35.0 NONRHEUMATIC AORTIC (VALVE) STENOSIS: ICD-10-CM

## 2023-02-09 DIAGNOSIS — Z90.81 ACQUIRED ABSENCE OF SPLEEN: Chronic | ICD-10-CM

## 2023-02-09 DIAGNOSIS — Z98.890 OTHER SPECIFIED POSTPROCEDURAL STATES: Chronic | ICD-10-CM

## 2023-02-09 LAB
GLUCOSE BLDC GLUCOMTR-MCNC: 84 MG/DL — SIGNIFICANT CHANGE UP (ref 70–99)
RH IG SCN BLD-IMP: POSITIVE — SIGNIFICANT CHANGE UP

## 2023-02-09 PROCEDURE — 93306 TTE W/DOPPLER COMPLETE: CPT | Mod: 26

## 2023-02-09 PROCEDURE — 93010 ELECTROCARDIOGRAM REPORT: CPT

## 2023-02-09 PROCEDURE — 33361 REPLACE AORTIC VALVE PERQ: CPT | Mod: Q0,62

## 2023-02-09 PROCEDURE — 33361 REPLACE AORTIC VALVE PERQ: CPT | Mod: 62,Q0

## 2023-02-09 DEVICE — CATH VASCULAR EXPO EXPO AMPLATZ LEFT CURVE (AL1) 0.045" X 5FR X 100CM: Type: IMPLANTABLE DEVICE | Status: FUNCTIONAL

## 2023-02-09 DEVICE — GUIDEWIRE AMPLATZ EXTRA-STIFF CURVED .035" X 260CM: Type: IMPLANTABLE DEVICE | Status: FUNCTIONAL

## 2023-02-09 DEVICE — GWIRE STR .038X180 STIFF LONG TAPR: Type: IMPLANTABLE DEVICE | Status: FUNCTIONAL

## 2023-02-09 DEVICE — CATH VASCULAR EXPO VENTRICULAR PIGTAIL CURVE (PIG 145) 0.045" X 5FR X 10CM: Type: IMPLANTABLE DEVICE | Status: FUNCTIONAL

## 2023-02-09 DEVICE — CATH VASCULAR EXPO VENTRICULAR PIGTAIL CURVE (PIG) 0.045" X 5FR X 100CM: Type: IMPLANTABLE DEVICE | Status: FUNCTIONAL

## 2023-02-09 DEVICE — SHEATH INTRODUCER TERUMO PINNACLE CORONARY 6FR X 10CM X 0.038" MINI WIRE: Type: IMPLANTABLE DEVICE | Status: FUNCTIONAL

## 2023-02-09 DEVICE — PACER KT BLLN FLW DIR 5FR: Type: IMPLANTABLE DEVICE | Status: FUNCTIONAL

## 2023-02-09 DEVICE — SUT PERCLOSE PROGLIDE 6FR: Type: IMPLANTABLE DEVICE | Status: FUNCTIONAL

## 2023-02-09 DEVICE — CATH IMPULSE FR4 5F X 100CM: Type: IMPLANTABLE DEVICE | Status: FUNCTIONAL

## 2023-02-09 DEVICE — WIRE GUIDE EXCHANGE J TIP 3MM X 260CM: Type: IMPLANTABLE DEVICE | Status: FUNCTIONAL

## 2023-02-09 DEVICE — IMPLANTABLE DEVICE: Type: IMPLANTABLE DEVICE | Status: FUNCTIONAL

## 2023-02-09 DEVICE — SHEATH INTRO DRYSEAL FLEX 22FR 33CM: Type: IMPLANTABLE DEVICE | Status: FUNCTIONAL

## 2023-02-09 DEVICE — GWIRE GUID  0.035INX150CM: Type: IMPLANTABLE DEVICE | Status: FUNCTIONAL

## 2023-02-09 DEVICE — WIRE GD SAFARI2 275CM XSML CRV: Type: IMPLANTABLE DEVICE | Status: FUNCTIONAL

## 2023-02-09 DEVICE — CATH TAVR EVOLUT FX 18FR 34MM: Type: IMPLANTABLE DEVICE | Status: FUNCTIONAL

## 2023-02-09 DEVICE — VALVE TAVR EVOLUT FX 34MM: Type: IMPLANTABLE DEVICE | Status: FUNCTIONAL

## 2023-02-09 DEVICE — BLLN Z-MED II 23MMX4X100CM: Type: IMPLANTABLE DEVICE | Status: FUNCTIONAL

## 2023-02-09 DEVICE — LOADING SYSTEM EVOLUT FX 34MM: Type: IMPLANTABLE DEVICE | Status: FUNCTIONAL

## 2023-02-09 DEVICE — SHEATH INTRODUCER TERUMO PINNACLE CORONARY 8FR X 10CM X 0.038" MINI WIRE: Type: IMPLANTABLE DEVICE | Status: FUNCTIONAL

## 2023-02-09 RX ORDER — ASPIRIN/CALCIUM CARB/MAGNESIUM 324 MG
81 TABLET ORAL DAILY
Refills: 0 | Status: DISCONTINUED | OUTPATIENT
Start: 2023-02-09 | End: 2023-02-10

## 2023-02-09 RX ORDER — SODIUM CHLORIDE 9 MG/ML
3 INJECTION INTRAMUSCULAR; INTRAVENOUS; SUBCUTANEOUS EVERY 8 HOURS
Refills: 0 | Status: DISCONTINUED | OUTPATIENT
Start: 2023-02-09 | End: 2023-02-09

## 2023-02-09 RX ORDER — FOLIC ACID 0.8 MG
1 TABLET ORAL
Qty: 0 | Refills: 0 | DISCHARGE

## 2023-02-09 RX ORDER — FOLIC ACID 0.8 MG
1 TABLET ORAL DAILY
Refills: 0 | Status: DISCONTINUED | OUTPATIENT
Start: 2023-02-09 | End: 2023-02-10

## 2023-02-09 RX ORDER — AMLODIPINE BESYLATE 2.5 MG/1
1 TABLET ORAL
Qty: 0 | Refills: 0 | DISCHARGE

## 2023-02-09 RX ORDER — GABAPENTIN 400 MG/1
200 CAPSULE ORAL ONCE
Refills: 0 | Status: COMPLETED | OUTPATIENT
Start: 2023-02-09 | End: 2023-02-09

## 2023-02-09 RX ORDER — CHLORHEXIDINE GLUCONATE 213 G/1000ML
1 SOLUTION TOPICAL ONCE
Refills: 0 | Status: DISCONTINUED | OUTPATIENT
Start: 2023-02-09 | End: 2023-02-09

## 2023-02-09 RX ORDER — LIDOCAINE HCL 20 MG/ML
0.2 VIAL (ML) INJECTION ONCE
Refills: 0 | Status: DISCONTINUED | OUTPATIENT
Start: 2023-02-09 | End: 2023-02-09

## 2023-02-09 RX ORDER — ACETAMINOPHEN 500 MG
1000 TABLET ORAL ONCE
Refills: 0 | Status: COMPLETED | OUTPATIENT
Start: 2023-02-09 | End: 2023-02-09

## 2023-02-09 RX ORDER — CEFUROXIME AXETIL 250 MG
1500 TABLET ORAL EVERY 8 HOURS
Refills: 0 | Status: COMPLETED | OUTPATIENT
Start: 2023-02-09 | End: 2023-02-09

## 2023-02-09 RX ORDER — ACETAMINOPHEN 500 MG
1000 TABLET ORAL ONCE
Refills: 0 | Status: DISCONTINUED | OUTPATIENT
Start: 2023-02-09 | End: 2023-02-10

## 2023-02-09 RX ORDER — ATORVASTATIN CALCIUM 80 MG/1
40 TABLET, FILM COATED ORAL AT BEDTIME
Refills: 0 | Status: DISCONTINUED | OUTPATIENT
Start: 2023-02-09 | End: 2023-02-10

## 2023-02-09 RX ADMIN — Medication 1000 MILLIGRAM(S): at 09:36

## 2023-02-09 RX ADMIN — Medication 100 MILLIGRAM(S): at 16:48

## 2023-02-09 RX ADMIN — Medication 1 MILLIGRAM(S): at 16:48

## 2023-02-09 RX ADMIN — GABAPENTIN 200 MILLIGRAM(S): 400 CAPSULE ORAL at 09:49

## 2023-02-09 RX ADMIN — Medication 81 MILLIGRAM(S): at 16:48

## 2023-02-09 RX ADMIN — Medication 100 MILLIGRAM(S): at 23:38

## 2023-02-09 NOTE — PATIENT PROFILE ADULT - WAS YOUR LAST COVID-19 VACCINE GREATER THAN OR EQUAL TO TWO MONTHS AGO?
Consultation - ENT   Roxi Cruz 39 y o  female MRN: [id number]  Unit/Bed#: E2 -01 Encounter: [id number]      Assessment/Plan     Assessment:  1  Peritonsillar phlegmon right sided - no abscess cavity noted  2  Pharyngeal edema right sided  3  Finding of a thyroid nodule on CT neck    Plan:  Despite multiple attempts to locate an abscess cavity, none was found  The CT findings demonstrated phlegmon and not an actual abscess  Because of the appearance of the soft palate I felt that it would be imperative to rule this out completely  There is some edema surrounding the tonsil as well as swelling of the right pharyngeal wall  Based on inability to drain any significant mucopus I feel that she should continue with IV antibiotics and steroids for the next 24 hours before switching to oral medications  Unfortunately, she has a scheduled flight to the [facility] tomorrow morning and may not be able to stay in the hospital   I did let her know that it would be against medical advice to leave the hospital due to the fact that she has some swelling of the upper airway  She has a lot to consider since she has children at home and family members that are waiting for her in the [facility]  Upon discharge I would recommend continuing the oral clindamycin and oral prednisone taper  If possible and if she remains within this country I would like to see her next week for a follow-up  She is aware that if she has any worsening of symptoms well in the [facility] she should proceed immediately to an emergency room  I have also recommended that she have a non urgent ultrasound of the thyroid upon return to the [place]  This will characterize the nodule found in the thyroid to determine whether not she requires a biopsy      History of Present Illness   Physician Requesting Consult: Kurt Sanches MD  Reason for Consult / Principal Problem: right peritonsillar abscess and upper airway edema    HPI: Destini Shaver is a 39y o  year old female who presents with a history of sore throat  She was seen in the ER on 6/26/21 and was given some Zithromax and Decadron for a sore throat  She started to improve and last night when she went to sleep she felt something "pop" in the throat - this relieved the pressure and pain but it returned and she presented to the ER after midnight for repeat evaluation  On examination she had some swelling on the right tonsillar and peritonsillar area  She had a CT scan completed which demonstrated phlegmon in the surrounding tissue and edema of the right pharynx extending down to the pyriform sinus  She was admitted and started on IV Clindamycin and Decadron  She is feeling better but still has complaints of swelling in the right side of the throat and difficulty with swallowing  Consults    Review of Systems   Constitutional: Negative for activity change, appetite change, fatigue, fever and unexpected weight change  HENT: Positive for sore throat and trouble swallowing  Negative for congestion, ear discharge, ear pain, hearing loss, nosebleeds, postnasal drip, rhinorrhea, sinus pressure, sinus pain, sneezing, tinnitus and voice change  Eyes: Negative  Negative for photophobia, pain, itching and visual disturbance  Respiratory: Negative  Negative for cough, chest tightness and shortness of breath  Cardiovascular: Negative  Negative for chest pain  Gastrointestinal: Negative  Negative for abdominal pain  Endocrine: Negative  Musculoskeletal: Negative  Negative for gait problem, neck pain and neck stiffness  Skin: Negative  Allergic/Immunologic: Negative  Negative for environmental allergies  Neurological: Negative  Negative for dizziness, speech difficulty, light-headedness and headaches  Hematological: Positive for adenopathy (right neck)  Psychiatric/Behavioral: Negative  Negative for sleep disturbance   The patient is not nervous/anxious  Historical Information   History reviewed  No pertinent past medical history    Past Surgical History:   Procedure Laterality Date    TUBAL LIGATION      WISDOM TOOTH EXTRACTION       Social History   Social History     Substance and Sexual Activity   Alcohol Use No     Social History     Substance and Sexual Activity   Drug Use No     E-Cigarette/Vaping    E-Cigarette Use Never User      E-Cigarette/Vaping Substances    Nicotine No     THC No     CBD No     Flavoring No      Social History     Tobacco Use   Smoking Status Never Smoker   Smokeless Tobacco Never Used     Family History:   Family History   Problem Relation Age of Onset    Diabetes Mother     Breast cancer Mother 68    No Known Problems Sister     Breast cancer Maternal Aunt 71    Breast cancer Maternal Aunt 72    Breast cancer Maternal Aunt 58    Breast cancer Maternal Aunt 54    No Known Problems Maternal Aunt     No Known Problems Maternal Aunt     No Known Problems Sister     No Known Problems Sister     Breast cancer Paternal Aunt         unknown age   Mohit Aguila No Known Problems Paternal Aunt     No Known Problems Paternal Aunt     No Known Problems Paternal Aunt     No Known Problems Paternal Aunt     No Known Problems Father     No Known Problems Daughter     No Known Problems Maternal Grandmother     No Known Problems Paternal Grandmother     No Known Problems Paternal Grandfather     No Known Problems Paternal Aunt     No Known Problems Paternal Aunt        Meds/Allergies   current meds:   Current Facility-Administered Medications   Medication Dose Route Frequency    acetaminophen (TYLENOL) tablet 650 mg  650 mg Oral Q6H PRN    aluminum-magnesium hydroxide-simethicone (MYLANTA) oral suspension 30 mL  30 mL Oral Q6H PRN    clindamycin (CLEOCIN) IVPB (premix in dextrose) 600 mg 50 mL  600 mg Intravenous Q8H    dexamethasone (DECADRON) injection 4 mg  4 mg Intravenous Daily    enoxaparin (LOVENOX) subcutaneous injection 40 mg  40 mg Subcutaneous Daily    ketorolac (TORADOL) injection 15 mg  15 mg Intravenous Q6H [address]    ondansetron (ZOFRAN) injection 4 mg  4 mg Intravenous Q6H PRN    simethicone (MYLICON) chewable tablet 80 mg  80 mg Oral 4x Daily PRN    sodium chloride 0 9 % infusion  75 mL/hr Intravenous Continuous    and PTA meds:   Prior to Admission Medications   Prescriptions Last Dose Informant Patient Reported? Taking? HYDROcodone-acetaminophen (NORCO) 5-325 mg per tablet   No No   Sig: Take 1 tablet by mouth every 4 (four) hours as needed for pain for up to 10 daysMax Daily Amount: 6 tablets   acetaminophen (TYLENOL) 650 mg CR tablet   No No   Sig: Take 1 tablet (650 mg total) by mouth every 8 (eight) hours as needed for mild pain   cholecalciferol 2000 units TABS   No No   Sig: Take 1 tablet (2,000 Units total) by mouth daily   clotrimazole-betamethasone (LOTRISONE) 1-0 05 % cream   No No   Sig: Apply topically 2 (two) times a day   naproxen (NAPROSYN) 500 mg tablet   No No   Sig: Take 1 tablet (500 mg total) by mouth 2 (two) times a day as needed for mild pain or moderate pain      Facility-Administered Medications: None       Allergies   Allergen Reactions    Penicillins Swelling       Objective       Intake/Output Summary (Last 24 hours) at 7/1/2021 0750  Last data filed at 7/1/2021 9669  Gross per 24 hour   Intake 331 25 ml   Output --   Net 331 25 ml       Invasive Devices     Peripheral Intravenous Line            Peripheral IV 06/30/21 Right Antecubital <1 day                Physical Exam     PHYSICAL  EXAMINATION    CONSTITUTION:    appears appropriate for age  No evidence of any acute distress  Communicates normally  Voice quality is clear  Alert and oriented  HEAD/FACE:    atraumatic, normocephalic on inspection  No scars present  Salivary glands are normal in texture and size without any asymmetry      Facial nerve function is symmetric and normal     EYES: Extraocular muscles intact in both eyes, normal gaze bilaterally and no evidence of nystagmus  Pupils equal, round, and accommodate to light bilaterally  EARS:    External ears normal     External canals are clear and dry  Tympanic membranes intact with normal mobility, no effusion, no retraction, no perforation  Post auricular area is normal    NOSE:    External nose without deformity  Internal mucosa pink and moist     Septum midline  Nasal turbinates normal in color and size  ORAL CAVITY:    lips normal and healthy in appearance  Dentition normal     Gums healthy, pink and moist     Tongue appears pink and moist with no lesions  Floor of mouth pink, moist, and smooth  Submandibular ducts patent with clear saliva  Parotid ducts patent with clear saliva  Oral mucosa pink and moist     Hard palate normal in appearance without any lesions  OROPHARYNX:    soft palate pink and moist without any lesions  Uvula midline without any lesions  Tonsils grade 1 bilaterally  Posterior pharynx pink and moist without any lesions    NECK:    supple and symmetric  No masses noted  Trachea midline  No thyromegaly or nodules noted  LYMPH:    No palpable adenopathy in left neck but the right side with zone 2 posterior triangle adenopathy present    SKIN:    No rashes  No lesions noted  Nasopharyngolaryngoscopy:    Verbal consent was obtained from the patient / parent  The patient was placed in the upright position in the examination chair  The scope was passed through the right nasal cavity (left side with a posterior spur impinging into the inferior turbinate) into the posterior nasopharynx  Evaluation of the pharynx and larynx was carried out with the following findings:    Eustachian Tube Orifice:  Patent bilaterally without edema or obstruction  Nasopharynx:  Smooth mucosa without adenoid bed or mass  Oropharynx:  No masses or lesions present    There is edema of the right pharyngeal wall extending down to the level of the pyriform sinus  Vallecula:  No abnormalities, pink moist mucosa  Tongue Base:  Normal without masses or asymmetry  Epiglottis:  Normal mucosa on vallecular and laryngeal surfaces  Pyriform Aperture:  Mucosa appears pink and moist without masses  Arytenoid Mucosa/Aryepiglottic Folds:  Normal mucosa without evidence of edema, hyperemia, mass or ulcer  False Vocal cords:  Normal mucosa, no evidence of mass, lesion or edema on the left side - the right posterior portion with minimal edema  True Vocal cords: White in color, no masses, nodules, polyps, edema, paresis or paralysis of either cord  Subglottic Space: The airway is patent and there are no lesions  After careful evaluation of the above structures, the scope was removed from the nasal cavity  The patient tolerated the procedure well  Incision and Drainage of Peritonsillar Abscess      Consent was signed and charted for Incision and drainage of a right peritonsillar abscess  I injected the right superior and middle  peritonsillar area with 1% xylocaine with 1:100,000 epinephrine  After waiting approximately three to five minutes to allow the anesthesia to work I used a 5ml syringe with a 20 gauge - 1 5 inch needle to localize the abscess  I was able to aspirate 0 ml of pus from the site - multiple aspiration attempts (3) were completed  The patient tolerated the procedure well        Lab Results:   CBC:   Lab Results   Component Value Date    WBC 20 84 (H) 07/01/2021    HGB 13 7 07/01/2021    HCT 42 8 07/01/2021    MCV 86 07/01/2021     07/01/2021    MCH 27 6 07/01/2021    MCHC 32 0 07/01/2021    RDW 13 4 07/01/2021    MPV 9 7 07/01/2021    NRBC 0 07/01/2021   , CMP:   Lab Results   Component Value Date    SODIUM 139 07/01/2021    K 3 9 07/01/2021     07/01/2021    CO2 25 07/01/2021    BUN 15 07/01/2021    CREATININE 0 69 07/01/2021    CALCIUM 9 1 07/01/2021    AST 18 06/30/2021    ALT 28 06/30/2021    ALKPHOS 96 06/30/2021    EGFR 106 07/01/2021   , Coags: No results found for: PT, PTT, INR  Imaging Studies: I have personally reviewed pertinent reports  Procedure: CT soft tissue neck    Result Date: 6/30/2021  Narrative: CT NECK WITH CONTRAST INDICATION:   + mono - now appears to have R peritonsillar abscess  COMPARISON:  None  TECHNIQUE:  Axial, sagittal, and coronal 2D reformatted images were created from the axial source data and submitted for interpretation  Radiation dose length product (DLP) for this visit:  738 mGy-cm   This examination, like all CT scans performed in the [facility], was performed utilizing techniques to minimize radiation dose exposure, including the use of iterative reconstruction and automated exposure control  IV Contrast:  85 mL of iohexol (OMNIPAQUE) IMAGE QUALITY:  Diagnostic  FINDINGS: VISUALIZED BRAIN PARENCHYMA:  No acute intracranial pathology of the visualized brain parenchyma  VISUALIZED ORBITS AND PARANASAL SINUSES:  Normal  NASAL CAVITY AND NASOPHARYNX:  Normal  SUPRAHYOID NECK:  Normal oral cavity, tongue base and epiglottis  There is marked enlargement of the right palatine tonsil with surrounding inflammatory phlegmonous changes likely infectious or inflammatory in nature  There is an ill-defined focus of low attenuation within this enlarged right palatine tonsil measuring  1 7 cm likely representing a developing abscess  There is right-sided parapharyngeal edema tracking caudally to the level of the right piriform sinus  Right piriform sinuses are nearly completely obliterated  There is no retropharyngeal edema  Inflammatory changes also extend to the right submandibular region  INFRAHYOID NECK:  Aryepiglottic folds and piriform sinuses are normal   Normal glottis and subglottic airway  THYROID GLAND: There is a 1 9 cm left thyroid lobe nodule    Incidental discovery of one or more Yes thyroid nodule(s) measuring more than 1 5 cm and without suspicious features is noted in this patient who is above 28years old; according to guidelines published in the February 2015 white paper on incidental thyroid nodules in the Journal of the [address] [place] [address] of [facility]), further characterization with thyroid ultrasound is recommended  PAROTID AND SUBMANDIBULAR GLANDS:  There is mild heterogeneity of the right submandibular gland when compared to the left likely reactive in nature  The bilateral parotid glands are unremarkable  LYMPH NODES:  There are multiple right cervical lymph nodes which are prominent to mildly enlarged measuring up to 1 3 cm  VASCULAR STRUCTURES:  Normal enhancement of the cervical vasculature  THORACIC INLET:  Lung apices and upper mediastinum are unremarkable  BONY STRUCTURES: No acute fracture or destructive osseous lesion  Impression: Marked enlargement of the right palatine tonsil with surrounding phlegmonous changes and adjacent right pharyngeal and parapharyngeal edema tracking to the level of the piriform sinuses  The findings are likely infectious in nature  There is a small focus of hypodensity within this enlarged palatine tonsil suspicious for a developing abscess  The oropharyngeal airway is mildly deviated to the left  Mild right cervical lymphadenopathy likely reactive in nature  1 9 cm left thyroid lobe nodule  A nonemergent thyroid ultrasound is recommended for further characterization     Workstation performed: [id number]   EKG, Pathology, and Other Studies: I have personally reviewed pertinent films in PACS    Code Status: Level 1 - Full Code  Advance Directive and Living Will:      Power of :    POLST:      None

## 2023-02-09 NOTE — BRIEF OPERATIVE NOTE - NSICDXBRIEFPROCEDURE_GEN_ALL_CORE_FT
PROCEDURES:  TAVR, percutaneous 09-Feb-2023 11:48:45 R transfemoral TAVR #34 Core Evolut FX Jaimie Amador

## 2023-02-09 NOTE — PATIENT PROFILE ADULT - NSPROHMSYMPCOND_GEN_A_NUR
Vitals and exam are normal    Ears look a little congested- recommended Flonase nasal spray and saline spray as needed
none

## 2023-02-10 ENCOUNTER — TRANSCRIPTION ENCOUNTER (OUTPATIENT)
Age: 78
End: 2023-02-10

## 2023-02-10 VITALS
RESPIRATION RATE: 17 BRPM | OXYGEN SATURATION: 100 % | SYSTOLIC BLOOD PRESSURE: 119 MMHG | HEART RATE: 92 BPM | TEMPERATURE: 99 F | DIASTOLIC BLOOD PRESSURE: 54 MMHG

## 2023-02-10 LAB
ANION GAP SERPL CALC-SCNC: 12 MMOL/L — SIGNIFICANT CHANGE UP (ref 5–17)
BUN SERPL-MCNC: 19 MG/DL — SIGNIFICANT CHANGE UP (ref 7–23)
CALCIUM SERPL-MCNC: 9.1 MG/DL — SIGNIFICANT CHANGE UP (ref 8.4–10.5)
CHLORIDE SERPL-SCNC: 106 MMOL/L — SIGNIFICANT CHANGE UP (ref 96–108)
CO2 SERPL-SCNC: 23 MMOL/L — SIGNIFICANT CHANGE UP (ref 22–31)
CREAT SERPL-MCNC: 0.92 MG/DL — SIGNIFICANT CHANGE UP (ref 0.5–1.3)
EGFR: 85 ML/MIN/1.73M2 — SIGNIFICANT CHANGE UP
GLUCOSE SERPL-MCNC: 80 MG/DL — SIGNIFICANT CHANGE UP (ref 70–99)
HCT VFR BLD CALC: 39.9 % — SIGNIFICANT CHANGE UP (ref 39–50)
HGB BLD-MCNC: 13.4 G/DL — SIGNIFICANT CHANGE UP (ref 13–17)
MCHC RBC-ENTMCNC: 33.4 PG — SIGNIFICANT CHANGE UP (ref 27–34)
MCHC RBC-ENTMCNC: 33.6 GM/DL — SIGNIFICANT CHANGE UP (ref 32–36)
MCV RBC AUTO: 99.5 FL — SIGNIFICANT CHANGE UP (ref 80–100)
NRBC # BLD: 0 /100 WBCS — SIGNIFICANT CHANGE UP (ref 0–0)
PLATELET # BLD AUTO: 159 K/UL — SIGNIFICANT CHANGE UP (ref 150–400)
POTASSIUM SERPL-MCNC: 4.1 MMOL/L — SIGNIFICANT CHANGE UP (ref 3.5–5.3)
POTASSIUM SERPL-SCNC: 4.1 MMOL/L — SIGNIFICANT CHANGE UP (ref 3.5–5.3)
RBC # BLD: 4.01 M/UL — LOW (ref 4.2–5.8)
RBC # FLD: 12.5 % — SIGNIFICANT CHANGE UP (ref 10.3–14.5)
SODIUM SERPL-SCNC: 141 MMOL/L — SIGNIFICANT CHANGE UP (ref 135–145)
WBC # BLD: 15.85 K/UL — HIGH (ref 3.8–10.5)
WBC # FLD AUTO: 15.85 K/UL — HIGH (ref 3.8–10.5)

## 2023-02-10 PROCEDURE — 76000 FLUOROSCOPY <1 HR PHYS/QHP: CPT

## 2023-02-10 PROCEDURE — 93010 ELECTROCARDIOGRAM REPORT: CPT

## 2023-02-10 PROCEDURE — 99232 SBSQ HOSP IP/OBS MODERATE 35: CPT

## 2023-02-10 PROCEDURE — 82962 GLUCOSE BLOOD TEST: CPT

## 2023-02-10 PROCEDURE — 86923 COMPATIBILITY TEST ELECTRIC: CPT

## 2023-02-10 PROCEDURE — C1725: CPT

## 2023-02-10 PROCEDURE — 80048 BASIC METABOLIC PNL TOTAL CA: CPT

## 2023-02-10 PROCEDURE — 93306 TTE W/DOPPLER COMPLETE: CPT

## 2023-02-10 PROCEDURE — 93005 ELECTROCARDIOGRAM TRACING: CPT

## 2023-02-10 PROCEDURE — C1760: CPT

## 2023-02-10 PROCEDURE — C1769: CPT

## 2023-02-10 PROCEDURE — C1889: CPT

## 2023-02-10 PROCEDURE — C1887: CPT

## 2023-02-10 PROCEDURE — 85027 COMPLETE CBC AUTOMATED: CPT

## 2023-02-10 PROCEDURE — C1894: CPT

## 2023-02-10 PROCEDURE — 93306 TTE W/DOPPLER COMPLETE: CPT | Mod: 26

## 2023-02-10 RX ORDER — ASPIRIN/CALCIUM CARB/MAGNESIUM 324 MG
1 TABLET ORAL
Qty: 0 | Refills: 0 | DISCHARGE
Start: 2023-02-10

## 2023-02-10 RX ADMIN — Medication 1 MILLIGRAM(S): at 12:21

## 2023-02-10 RX ADMIN — Medication 81 MILLIGRAM(S): at 05:01

## 2023-02-10 NOTE — DISCHARGE NOTE PROVIDER - NSDCCPTREATMENT_GEN_ALL_CORE_FT
PRINCIPAL PROCEDURE  Procedure: TAVR, percutaneous  Findings and Treatment: R transfemoral TAVR #34 Core Evolut FX

## 2023-02-10 NOTE — DISCHARGE NOTE PROVIDER - NSDCFUADDINST_GEN_ALL_CORE_FT
Activity: Follow Instructions Provided by your Surgical Team    TAVR: Transcatheter aortic valve replacement   You will receive a card in the mail about your NEW valve!   (when you get it, place in your wallet to carry with you at all times)    - Be sure to tell your doctors/dentist about your new valve, as you will need to take antibiotics to reduce risk of infection before dental/medical procedures    Wound Care:   Keep groin site/s clean  clean using  mild soap and gentle warm, water stream, pat dry. leave OPEN to air. YOU MAY SHOWER   DO NOT apply lotions, creams, ointments, powder, perfumes to your incision site  DO NOT SOAK your site for 1 week ( no baths, no pools, no tubs, etc...)  Check  your groin daily. A small amount of bruising, and soreness are normal    ACTIVITY:   - you may resume  all your normal activities,  unless otherwise instructed by your cardiologist      Your procedure was done through the GRO[facility]: for the NEXT 5 DAYS  - Limit climbing stairs, DO NOT soak in bathtub or pool  - no strenuous activities, pushing, pulling, straining  - Do not lift anything 10lbs or heavier     MEDICATION:   take your medications as explained ( see discharge paperwork)   Take as prescribed DO NOT STOP taking them without consulting with your cardiologist first.     Follow heart healthy diet recommended by your doctor, if you smoke STOP SMOKING ( may call [phone] for center of tobacco control if you need assistance), exercise regularly.    FOLLOW UP:   Follow up with CTS  in 1 week post discharge; then follow up with the [facility]  in 30 days ( with a repeat Transthoracic Echo at that visit).  YOU will be discharged on an MCOT ( Mobile Cardiac Telemetry) for a period of 30days to monitor for rhythm changes post TAVR      ***CALL YOUR DOCTOR***  if you experience: fever, chills, body aches, or severe pain, swelling, redness, heat or yellow discharge at incision site  If you experience Bleeding or excruciating pain at the procedural site, swelling ( golf ball size) at your procedural site  If you experience CHEST PAIN  If you experience extremity numbness, tingling, temperature change ( of your procedural site)   If you are unable to reach your doctor, you may contact:     -Cardiothoracic  Office at [facility] at [phone] or   - [facility] [phone]  - [phone]

## 2023-02-10 NOTE — DISCHARGE NOTE PROVIDER - CARE PROVIDER_API CALL
Godfrey Gonzalez)  Thoracic and Cardiac Surgery  [address] [place] [address]  Phone: [phone]  Fax: [phone]  Scheduled Appointment: 02/14/2023   Godfrey Gonzalez)  Thoracic and Cardiac Surgery  [address] [place] [address]  Phone: [phone]  Fax: [phone]  Established Patient  Scheduled Appointment: 02/14/2023 08:45 AM

## 2023-02-10 NOTE — PROGRESS NOTE ADULT - SUBJECTIVE AND OBJECTIVE BOX
Structural Heart Team    Mr Chacon is lying comfortably in bed. He denies chest pain/pressure, sob and dizziness as well as groin pain. There were no acute events overnight.       REVIEW OF SYSTEMS:    CONSTITUTIONAL: No weakness, fevers or chills  EYES/ENT: No visual changes;  No vertigo or throat pain   NECK: No pain or stiffness  RESPIRATORY: No cough, wheezing, hemoptysis; No shortness of breath  CARDIOVASCULAR: No chest pain or palpitations  GASTROINTESTINAL: No abdominal or epigastric pain. No nausea, vomiting, or hematemesis; No diarrhea or constipation. No melena or hematochezia.  GENITOURINARY: No dysuria, frequency or hematuria  NEUROLOGICAL: No numbness or weakness  SKIN: No itching, rashes      Allergies    sulfa drugs (Other)    Intolerances      Vital Signs Last 24 Hrs  T(C): 36.9 (10 Feb 2023 08:06), Max: 37.3 (10 Feb 2023 04:40)  T(F): 98.4 (10 Feb 2023 08:06), Max: 99.1 (10 Feb 2023 04:40)  HR: 97 (10 Feb 2023 10:15) (46 - 98)  BP: 117/64 (10 Feb 2023 10:15) (91/55 - 144/64)  BP(mean): 81 (10 Feb 2023 10:15) (64 - 88)  RR: 17 (10 Feb 2023 08:06) (16 - 18)  SpO2: 100% (10 Feb 2023 10:15) (93% - 100%)    Parameters below as of 10 Feb 2023 08:06  Patient On (Oxygen Delivery Method): room air        MEDICATIONS  (STANDING):  acetaminophen   IVPB .. 1000 milliGRAM(s) IV Intermittent once  aspirin enteric coated 81 milliGRAM(s) Oral daily  atorvastatin 40 milliGRAM(s) Oral at bedtime  calcium carbonate 1250 mG  + Vitamin D (OsCal 500 + D) 1 Tablet(s) Oral once  cefuroxime  IVPB 1500 milliGRAM(s) IV Intermittent once  folic acid 1 milliGRAM(s) Oral daily      Exam-  General: NAD, WDWN, appropriate affect  Cor: s1s2, RRR, no murmurs   EKG/Tele: SR, , QRS 78  Pulm: Clear, no wheezes, rales, or rhonchi, no use of accessory muscles  Gastrointestinal: soft, nontender, nondistended, +bowel sounds  Extremities: no edema, 2+ DP pulses  Groin: dressings intact, nontender, clean and dry, soft, no hematoma b/l  Neuro: A&Ox3, nonfocal                          13.4   15.85 )-----------( 159      ( 10 Feb 2023 01:23 )             39.9   02-10    141  |  106  |  19  ----------------------------<  80  4.1   |  23  |  0.92    Ca    9.1      10 Feb 2023 01:23      I&O's Summary    09 Feb 2023 07:01  -  10 Feb 2023 07:00  --------------------------------------------------------  IN: 1160 mL / OUT: 0 mL / NET: 1160 mL    10 Feb 2023 07:01  -  10 Feb 2023 11:39  --------------------------------------------------------  IN: 240 mL / OUT: 0 mL / NET: 240 mL    < from: Transthoracic Echocardiogram (02.10.23 @ 07:00) >  LA:     3.4    2.0 - 4.0 cm  Ao:     3.1    2.0 - 3.8 cm  SEPTUM: 0.9    0.6 - 1.2 cm  PWT:0.8    0.6 - 1.1 cm  LVIDd:  3.2    3.0 - 5.6 cm  LVIDs:  2.2    1.8 - 4.0 cm  Derived variables:  LVMI: 39 g/m2  RWT: 0.50  Fractional short: 31 %  EF (Salvador Rule): 73 %Doppler Peak Velocity (m/sec):  AoV=1.7  ------------------------------------------------------------------------  Observations:  Mitral Valve: Mitral annular calcification, otherwise  normal mitral valve. Minimal mitral regurgitation.  Aortic Valve/Aorta: Transcatheter aortic valve replacement.  The valve is well seated.  Peaktransaortic valve gradient  equals 12 mm Hg, mean transaortic valve gradient equals 6  mm Hg, DI is 0.75 which is probably normal in the presence  of a transcatheter aortic valve replacement. Mild  paravalvular aortic regurgitation.  No aortic transvalvular  regurgitation seen. Peak left ventricular outflow tract  gradient equals 8 mm Hg, mean gradient is equal to 6 mm Hg,  LVOT velocity time integral equals 24 cm.  Aortic Root: 3.1 cm.  Ascending Aorta: 2.5 cm.  LVOT diameter: 2.3 cm.  Left Atrium: Normal left atrium.  LA volume index = 32  cc/m2.  Left Ventricle: Hyperdynamic left ventricular systolic  function. Normal left ventricular internal dimensions and  wall thicknesses. Indeterminate diastolic function.  Right Heart: Normal right atrium. Normal right ventricular  size and function. Normal tricuspid valve. Minimal  tricuspid regurgitation. Normal pulmonic valve. No pulmonic  regurgitation.  Pericardium/Pleura: Normal pericardium with no pericardial  effusion.  Hemodynamic: Estimated right atrial pressure is 8 mm Hg.  Estimated right ventricular systolic pressure equals 27 mm  Hg, assuming right atrial pressure equals 8 mm Hg,  consistent with normal pulmonary pressures. Color Doppler  demonstrates no evidence of a patent foramen ovale.  ------------------------------------------------------------------------  Conclusions:  1. Mitral annular calcification, otherwise normal mitral  valve. Minimal mitral regurgitation.  2. Transcatheter aortic valve replacement. The valve is  well seated.  Peak transaortic valve gradient equals 12 mm  Hg, mean transaortic valve gradient equals 6 mm Hg, DI is  0.75 which is probably normal in the presence of a  transcatheter aortic valve replacement. Mild paravalvular  aortic regurgitation.  No aortic transvalvular  regurgitation seen.  3. Hyperdynamic left ventricular systolic function.  4. Indeterminate diastolic function.  5. Normal right ventricular size and function.  *** Compared with echocardiogram of 2/9/2023, no  significant changes noted.    < end of copied text >            Assessment/Plan:  Mr Chacon is POD 1 s/p TF TAVR (34 CV) for severe symptomatic AS  - EKG stable  - post op TTE noted  - asa  - resume preop meds  - ambulate as tolerated  - d/c home today  -- will d/c home with an [facility]OT to monitor for post TAVR conduction delays and arrhythmias for 30 days    - Discharge plan: follow up with Dr. Gonzalez in one week and follow up with Structural Heart Team in one month.  Echo will be done at 1 month follow up visit.  Plan discussed with patient     OANH Gottlieb  [phone]

## 2023-02-10 NOTE — DISCHARGE NOTE PROVIDER - NSDCMRMEDTOKEN_GEN_ALL_CORE_FT
amLODIPine 2.5 mg oral tablet: 1 tab(s) orally once a day  aspirin 81 mg oral delayed release tablet: 1 tab(s) orally once a day  Calcium 600+D oral tablet: 1 tab(s) orally 2 times a day  folic acid 1 mg oral tablet: 1 tab(s) orally once a day  pravastatin 40 mg oral tablet: 1 tab(s) orally once a day

## 2023-02-10 NOTE — DISCHARGE NOTE PROVIDER - HOSPITAL COURSE
HPI:  This is a 78 y/o male PMH lumbar stenosis, bladder stone, BPH, hypertension, HLD, spherocytic anemia at age 5, S/P splenectomy, severe aortic stenosis, denies worsening dyspnea, angina or syncope.  Presents today for TAVR 2/09/23, angiogram scheduled for tomorrow.    No H/O COVID infection, COVID swab scheduled 2/06/23 (01 Feb 2023 14:19)    2/9 s/p TAVR procedure, 34mm core valve. B/L femoral access sites without swelling, bleeding   HPI:  This is a 78 y/o male PMH lumbar stenosis, bladder stone, BPH, hypertension, HLD, spherocytic anemia at age 5, S/P splenectomy, severe aortic stenosis, denies worsening dyspnea, angina or syncope.  Presents today for TAVR 2/09/23, angiogram scheduled for tomorrow.    No H/O COVID infection, COVID swab scheduled 2/06/23 (01 Feb 2023 14:19)    2/9 s/p TAVR procedure, 34mm core valve. B/L femoral access sites without swelling, bleeding.    EKG and Echo reviewed with normal results. TTE findings-Valve well seated with no leak.

## 2023-02-10 NOTE — DISCHARGE NOTE NURSING/CASE MANAGEMENT/SOCIAL WORK - PATIENT PORTAL LINK FT
You can access the FollowMyHealth Patient Portal offered by [facility] by registering at the following website: [email or web address]. By joining [facility]’s FollowMyHealth portal, you will also be able to view your health information using other applications (apps) compatible with our system.

## 2023-02-10 NOTE — DISCHARGE NOTE PROVIDER - PROVIDER TOKENS
PROVIDER:[TOKEN:[[id number]],SCHEDULEDAPPT:[02/14/2023]] PROVIDER:[TOKEN:[[id number]],SCHEDULEDAPPT:[02/14/2023],SCHEDULEDAPPTTIME:[08:45 AM],ESTABLISHEDPATIENT:[T]]

## 2023-02-10 NOTE — DISCHARGE NOTE PROVIDER - NSDCACTIVITY_GEN_ALL_CORE
Showering allowed/Walking - Indoors allowed/No heavy lifting/straining/Walking - Outdoors allowed Showering allowed/Driving allowed/Walking - Indoors allowed/No heavy lifting/straining/Walking - Outdoors allowed

## 2023-02-10 NOTE — DISCHARGE NOTE NURSING/CASE MANAGEMENT/SOCIAL WORK - NSDCPEFALRISK_GEN_ALL_CORE
For information on Fall & Injury Prevention, visit: [email or web address] OR  [email or web address] OR  https://www.cdc.gov/steadi/patient.html

## 2023-02-10 NOTE — DISCHARGE NOTE PROVIDER - NSDCCPCAREPLAN_GEN_ALL_CORE_FT
PRINCIPAL DISCHARGE DIAGNOSIS  Diagnosis: Aortic stenosis  Assessment and Plan of Treatment: No heavy lifting, strenuous activity, bending, straining, or unnecessary stair climbing for 2 weeks. No driving for 2 days. You may shower 24 hours following the procedure but avoid baths/swimming for 1 week. Check your groin site for bleeding and/or swelling daily following procedure and call your doctor immediately if it occurs or if you experience increased pain at the site. Follow up with your cardiologist in 1-2 weeks. You may call [facility] if you have any questions/concerns regarding your procedure [phone].      SECONDARY DISCHARGE DIAGNOSES  Diagnosis: HTN (hypertension)  Assessment and Plan of Treatment: Continue with your blood pressure medications; eat a heart healthy diet with low salt diet; exercise regularly (consult with your physician or cardiologist first); maintain a heart healthy weight; if you smoke - quit (A resource to help you stop smoking is the [facility] for Tobacco Control – phone number [phone].); include healthy ways to manage stress. Continue to follow with your primary care physician or cardiologist.    Diagnosis: HLD (hyperlipidemia)  Assessment and Plan of Treatment: Continue with your cholesterol medications. Eat a heart healthy diet that is low in saturated fats and salt, and includes whole grains, fruits, vegetables and lean protein; exercise regularly (consult with your physician or cardiologist first); maintain a heart healthy weight; if you smoke - quit (A resource to help you stop smoking is the [facility] for Tobacco Control – phone number [phone].). Continue to follow with your primary physician or cardiologist.

## 2023-02-10 NOTE — DISCHARGE NOTE PROVIDER - NSDCFUSCHEDAPPT_GEN_ALL_CORE_FT
Godfrey Gonzalez  [facility]  [facility]  Scheduled Appointment: 02/14/2023

## 2023-02-11 ENCOUNTER — TRANSCRIPTION ENCOUNTER (OUTPATIENT)
Age: 78
End: 2023-02-11

## 2023-02-13 ENCOUNTER — APPOINTMENT (OUTPATIENT)
Dept: CARE COORDINATION | Facility: HOME HEALTH | Age: 78
End: 2023-02-13

## 2023-02-13 VITALS
RESPIRATION RATE: 17 BRPM | SYSTOLIC BLOOD PRESSURE: 127 MMHG | BODY MASS INDEX: 25.79 KG/M2 | DIASTOLIC BLOOD PRESSURE: 60 MMHG | OXYGEN SATURATION: 16 % | HEART RATE: 96 BPM | WEIGHT: 155 LBS

## 2023-02-13 PROBLEM — Z95.3 S/P TAVR (TRANSCATHETER AORTIC VALVE REPLACEMENT), BIOPROSTHETIC: Status: ACTIVE | Noted: 2023-02-13

## 2023-02-13 RX ORDER — MUPIROCIN 20 MG/G
2 OINTMENT TOPICAL
Qty: 1 | Refills: 0 | Status: DISCONTINUED | COMMUNITY
Start: 2023-02-03 | End: 2023-02-13

## 2023-02-13 RX ORDER — ASPIRIN 81 MG/1
81 TABLET ORAL DAILY
Refills: 0 | Status: ACTIVE | COMMUNITY

## 2023-02-13 NOTE — PHYSICAL EXAM
[] : no respiratory distress [Exaggerated Use Of Accessory Muscles For Inspiration] : no accessory muscle use [Auscultation Breath Sounds / Voice Sounds] : lungs were clear to auscultation bilaterally [Heart Sounds] : normal S1 and S2 [Chest Visual Inspection Thoracic Asymmetry] : no chest asymmetry [Bowel Sounds] : normal bowel sounds [FreeTextEntry1] : B/l groin NEHEMIAS, clean, dry and intact.  right groin mild seroussanguinous drainage noted and echymosis noted. Left groin -No erythema, hematoma or drainage noted.

## 2023-02-13 NOTE — COUNSELING
[Hygeine (Including Daily Shower)] : hygeine (including daily shower) [Importance of Regular Medical Follow-Up] : the importance of regular medical follow-up [No Heavy Lifting] : no heavy lifting (>15-20 lb. for 1 month or 25 lb. for 3 months from date of surgery) [Blood Pressure Control] : blood pressure control [S/S of infection] : signs and symptoms of infection (and to whom it should be reported) [Progressive Ambulation/Activity] : progressive ambulation/activity [Medication/Vitamin/Herb/Food Interaction] : medication/vitamin/herb/food interaction [SBE antibiotic prophylaxis] : SBE antibiotic prophylaxis was recommended [Stress Management] : stress management

## 2023-02-13 NOTE — HISTORY OF PRESENT ILLNESS
[FreeTextEntry1] : This is a 78 y/o male PMH lumbar stenosis, bladder stone, BPH, hypertension, \par HLD, spherocytic anemia at age 5, S/P splenectomy, severe aortic stenosis, \par denies worsening dyspnea, angina or syncope.  Presents today for TAVR 2/09/23, \par angiogram scheduled for tomorrow. \par \par No H/O COVID infection, COVID swab scheduled 2/06/23 (01 Feb 2023 14:19) \par \par 2/9 s/p TAVR procedure, 34mm core valve. B/L femoral access sites without \par swelling, bleeding. \par \par EKG and Echo reviewed with normal results. TTE findings-Valve well seated with \par no leak.\par \par 2/13 Initial visit completed at home\par CC "I am doing ok"

## 2023-02-14 ENCOUNTER — APPOINTMENT (OUTPATIENT)
Dept: CARDIOTHORACIC SURGERY | Facility: CLINIC | Age: 78
End: 2023-02-14
Payer: MEDICARE

## 2023-02-14 VITALS
TEMPERATURE: 98.5 F | HEART RATE: 82 BPM | OXYGEN SATURATION: 98 % | SYSTOLIC BLOOD PRESSURE: 126 MMHG | RESPIRATION RATE: 14 BRPM | DIASTOLIC BLOOD PRESSURE: 70 MMHG

## 2023-02-14 VITALS — HEIGHT: 65 IN | BODY MASS INDEX: 25.83 KG/M2 | WEIGHT: 155 LBS

## 2023-02-14 DIAGNOSIS — Z95.3 PRESENCE OF XENOGENIC HEART VALVE: ICD-10-CM

## 2023-02-14 PROCEDURE — 99213 OFFICE O/P EST LOW 20 MIN: CPT

## 2023-02-14 NOTE — ASSESSMENT
[FreeTextEntry1] : This is a 78 y/o male PMH lumbar stenosis, bladder stone, BPH, hypertension, HLD, spherocytic anemia at age 5, S/P splenectomy, severe aortic stenosis, denies worsening dyspnea, angina or syncope.  Presents today for TAVR 2/09/23, angiogram scheduled for tomorrow. \par \par On 2/9 s/p TAVR procedure, 34mm core valve. B/L femoral access sites without swelling, bleeding. EKG and Echo reviewed with normal results. TTE findings-Valve well seated with no leak. \par \par Today on exam, patient's lungs are clear bilaterally and bilateral groins are clean, dry and intact. There is no hematoma. No peripheral edema noted on exam. \par \par MCOT reports were reviewed.  SBE antibiotic prophylaxis discussed at length.  Patient instructed to continue current medication regimen.\par \par Plan:\par 1) Follow up with cardiologist\par 2) SBE antibiotic prophylaxis discussed \par 3) Follow up in 30 days with cardiologist for repeat transthoracic echocardiogram.\par \par \par \par

## 2023-02-14 NOTE — PHYSICAL EXAM
[] : no respiratory distress [Respiration, Rhythm And Depth] : normal respiratory rhythm and effort [Prosthetic Aortic Valve] : prosthetic aortic valve heard [Clean] : clean [Dry] : dry [Healing Well] : healing well [No Edema] : no edema

## 2023-02-14 NOTE — DISCUSSION/SUMMARY
[Doing Well] : is doing well [Excellent Pain Control] : has excellent pain control [No Sign of Infection] : is showing no signs of infection [Coumadin] : the patient is not on Coumadin

## 2023-03-13 ENCOUNTER — TRANSCRIPTION ENCOUNTER (OUTPATIENT)
Age: 78
End: 2023-03-13

## 2023-06-22 ENCOUNTER — APPOINTMENT (OUTPATIENT)
Dept: UROLOGY | Facility: CLINIC | Age: 78
End: 2023-06-22
Payer: MEDICARE

## 2023-06-22 VITALS
TEMPERATURE: 98 F | SYSTOLIC BLOOD PRESSURE: 124 MMHG | HEIGHT: 65 IN | DIASTOLIC BLOOD PRESSURE: 70 MMHG | OXYGEN SATURATION: 98 % | BODY MASS INDEX: 25.83 KG/M2 | HEART RATE: 77 BPM | WEIGHT: 155 LBS

## 2023-06-22 DIAGNOSIS — N40.0 BENIGN PROSTATIC HYPERPLASIA WITHOUT LOWER URINARY TRACT SYMPMS: ICD-10-CM

## 2023-06-22 DIAGNOSIS — N21.0 CALCULUS IN BLADDER: ICD-10-CM

## 2023-06-22 PROCEDURE — 99204 OFFICE O/P NEW MOD 45 MIN: CPT

## 2023-06-27 NOTE — HISTORY OF PRESENT ILLNESS
[FreeTextEntry1] : cc retention\par 77 yo male recent admission for spine surgery \par post op cath placed\par saw dr ram and had cath removed \par voiding ok \par slow flow some hesitancy\par prior ct large bladderstone and left lower pole stone

## 2023-06-27 NOTE — ASSESSMENT
[FreeTextEntry1] : voiding ok \par pvr ok \par reviewed management of bladder and kidney stone \par reviewed open lithalopaxy, cystolithalopaxy \par will consider \par cont tamsulosin

## 2023-09-26 RX ORDER — TAMSULOSIN HYDROCHLORIDE 0.4 MG/1
0.4 CAPSULE ORAL
Qty: 30 | Refills: 2 | Status: ACTIVE | COMMUNITY
Start: 2023-06-22 | End: 1900-01-01

## (undated) DEVICE — SAW BLADE MICROAIRE STERNUM 1.1X50X42MM

## (undated) DEVICE — DRAPE LIGHT HANDLE COVER (BLUE)

## (undated) DEVICE — CHEST DRAIN PLEUR-EVAC WET/WET ADULT-PEDS SINGLE (QUICK)

## (undated) DEVICE — SUT PROLENE 5-0 30" RB-2

## (undated) DEVICE — WARMING BLANKET DUO-THERM HYPER/HYPOTHERM ADULT

## (undated) DEVICE — SAW BLADE MICROAIRE STERNUM 1X34X9.4MM

## (undated) DEVICE — DRAPE IOBAN 23" X 23"

## (undated) DEVICE — ELCTR GROUNDING PAD ADULT COVIDIEN

## (undated) DEVICE — GLV 6.5 PROTEXIS (WHITE)

## (undated) DEVICE — MNFLD SETUP

## (undated) DEVICE — CHEST DRAIN PLEUR-EVAC WET/WET PEDS SINGLE

## (undated) DEVICE — SUT POLYSORB 1 36" GS-25

## (undated) DEVICE — SOL IRR POUR H2O 250ML

## (undated) DEVICE — SUT PLEDGET PRE PUNCH 4.8 X 9.5 X 1.5 MM

## (undated) DEVICE — Device

## (undated) DEVICE — GLV 8.5 PROTEXIS (WHITE)

## (undated) DEVICE — ELCTR BOVIE PENCIL HANDPIECE ROCKER SWITCH 15FT

## (undated) DEVICE — SPECIMEN CONTAINER 100ML

## (undated) DEVICE — POSITIONER FOAM EGG CRATE ULNAR 2PCS (PINK)

## (undated) DEVICE — STEALTH CLAMP INSERT FIBRA/FIBRA 90MM

## (undated) DEVICE — GLV 7.5 PROTEXIS (WHITE)

## (undated) DEVICE — DRSG OPSITE 13.75 X 4"

## (undated) DEVICE — SUT BIOSYN 4-0 18" P-12

## (undated) DEVICE — MEDICATION LABELS W MARKER

## (undated) DEVICE — GLV 7 PROTEXIS (WHITE)

## (undated) DEVICE — PACK CARDIAC YELLOW

## (undated) DEVICE — PACK UNIVERSAL CARDIAC

## (undated) DEVICE — NDL COUNTER FOAM AND MAGNET 40-70

## (undated) DEVICE — DRSG OPSITE 2.5 X 2"

## (undated) DEVICE — SUT ETHIBOND 2-0 36" SH

## (undated) DEVICE — DRAPE C ARM UNIVERSAL

## (undated) DEVICE — DRSG TEGADERM 6"X8"

## (undated) DEVICE — DRSG STERISTRIPS 0.5 X 4"

## (undated) DEVICE — DRAPE INSTRUMENT POUCH 6.75" X 11"

## (undated) DEVICE — FOLEY TRAY 16FR 5CC LF LUBRISIL ADVANCE TEMP CLOSED

## (undated) DEVICE — WARMING BLANKET FULL UNDERBODY

## (undated) DEVICE — SYR ASEPTO

## (undated) DEVICE — SUT SOFSILK 0 30" V-20

## (undated) DEVICE — TUBING SUCTION 20FT

## (undated) DEVICE — SUT PROLENE 4-0 36" SH

## (undated) DEVICE — SUT PROLENE 3-0 36" SH

## (undated) DEVICE — SUT PROLENE 5-0 36" RB-1

## (undated) DEVICE — DRAPE TOWEL BLUE 17" X 24"

## (undated) DEVICE — SUT SOFSILK 2 60" TIES

## (undated) DEVICE — SUT PROLENE 4-0 36" BB

## (undated) DEVICE — SOL NORMOSOL-R PH7.4 1000ML

## (undated) DEVICE — PACING CABLE TEMP MEDTRONIC WITH PAC-LOC

## (undated) DEVICE — GOWN XXXL

## (undated) DEVICE — DRAPE 3/4 SHEET W REINFORCEMENT 56X77"

## (undated) DEVICE — GLV 8 PROTEXIS (WHITE)

## (undated) DEVICE — DEFIBRILLATOR PAD PRE-CONNECT ADULT/CHILD

## (undated) DEVICE — SOL IRR POUR NS 0.9% 500ML

## (undated) DEVICE — VESSEL LOOP EXTRA MAXI-BLUE 0.200" X 22"

## (undated) DEVICE — ELCTR REM POLYHESIVE ADULT PT RETURN 15FT